# Patient Record
Sex: FEMALE | Race: WHITE | NOT HISPANIC OR LATINO | Employment: FULL TIME | URBAN - NONMETROPOLITAN AREA
[De-identification: names, ages, dates, MRNs, and addresses within clinical notes are randomized per-mention and may not be internally consistent; named-entity substitution may affect disease eponyms.]

---

## 2017-12-14 ENCOUNTER — TRANSCRIBE ORDERS (OUTPATIENT)
Dept: ADMINISTRATIVE | Facility: HOSPITAL | Age: 37
End: 2017-12-14

## 2017-12-14 DIAGNOSIS — N63.0 LUMP OR MASS IN BREAST: Primary | ICD-10-CM

## 2018-01-16 ENCOUNTER — HOSPITAL ENCOUNTER (OUTPATIENT)
Dept: MAMMOGRAPHY | Facility: HOSPITAL | Age: 38
Discharge: HOME | End: 2018-01-16
Admitting: INTERNAL MEDICINE

## 2018-01-16 ENCOUNTER — HOSPITAL ENCOUNTER (OUTPATIENT)
Dept: ULTRASOUND IMAGING | Facility: HOSPITAL | Age: 38
Discharge: HOME | End: 2018-01-16

## 2018-01-16 DIAGNOSIS — N63.0 LUMP OR MASS IN BREAST: ICD-10-CM

## 2018-01-16 PROCEDURE — 77062 BREAST TOMOSYNTHESIS BI: CPT | Performed by: RADIOLOGY

## 2018-01-16 PROCEDURE — 77066 DX MAMMO INCL CAD BI: CPT

## 2018-01-16 PROCEDURE — 77066 DX MAMMO INCL CAD BI: CPT | Performed by: RADIOLOGY

## 2018-01-16 PROCEDURE — 76642 ULTRASOUND BREAST LIMITED: CPT | Performed by: RADIOLOGY

## 2018-01-16 PROCEDURE — 76642 ULTRASOUND BREAST LIMITED: CPT

## 2018-01-16 PROCEDURE — G0279 TOMOSYNTHESIS, MAMMO: HCPCS

## 2021-05-04 ENCOUNTER — HOSPITAL ENCOUNTER (OUTPATIENT)
Dept: HOSPITAL 79 - LAB | Age: 41
End: 2021-05-04
Attending: COLON & RECTAL SURGERY
Payer: COMMERCIAL

## 2021-05-04 DIAGNOSIS — K60.1: Primary | ICD-10-CM

## 2021-05-04 DIAGNOSIS — R94.31: ICD-10-CM

## 2021-05-04 LAB
BUN/CREATININE RATIO: 30 (ref 0–10)
HGB BLD-MCNC: 11.5 GM/DL (ref 12.3–15.3)
RED BLOOD COUNT: 3.56 M/UL (ref 4–5.1)
WHITE BLOOD COUNT: 7.2 K/UL (ref 4.5–11)

## 2022-01-30 ENCOUNTER — HOSPITAL ENCOUNTER (OUTPATIENT)
Dept: HOSPITAL 79 - LAB | Age: 42
End: 2022-01-30
Attending: INTERNAL MEDICINE
Payer: COMMERCIAL

## 2022-01-30 DIAGNOSIS — Z79.899: ICD-10-CM

## 2022-01-30 DIAGNOSIS — B15.9: ICD-10-CM

## 2022-01-30 DIAGNOSIS — E11.9: ICD-10-CM

## 2022-01-30 DIAGNOSIS — I10: ICD-10-CM

## 2022-01-30 DIAGNOSIS — R76.8: ICD-10-CM

## 2022-01-30 DIAGNOSIS — Z01.84: Primary | ICD-10-CM

## 2022-01-30 LAB
BUN/CREATININE RATIO: 25 (ref 0–10)
HGB BLD-MCNC: 11 GM/DL (ref 12.3–15.3)
RED BLOOD COUNT: 3.79 M/UL (ref 4–5.1)
WHITE BLOOD COUNT: 5.9 K/UL (ref 4.5–11)

## 2022-02-01 LAB — VITAMIN D, 25-HYDROXY: 45.3 NG/ML (ref 30–100)

## 2022-08-29 ENCOUNTER — HOSPITAL ENCOUNTER (OUTPATIENT)
Dept: HOSPITAL 79 - EXRD | Age: 42
End: 2022-08-29
Attending: NURSE PRACTITIONER
Payer: COMMERCIAL

## 2022-08-29 DIAGNOSIS — E04.1: ICD-10-CM

## 2022-08-29 DIAGNOSIS — Z80.8: Primary | ICD-10-CM

## 2022-11-30 ENCOUNTER — OFFICE VISIT (OUTPATIENT)
Dept: ENDOCRINOLOGY | Facility: CLINIC | Age: 42
End: 2022-11-30

## 2022-11-30 VITALS
SYSTOLIC BLOOD PRESSURE: 106 MMHG | BODY MASS INDEX: 42.54 KG/M2 | OXYGEN SATURATION: 97 % | WEIGHT: 287.2 LBS | DIASTOLIC BLOOD PRESSURE: 72 MMHG | HEIGHT: 69 IN | HEART RATE: 85 BPM

## 2022-11-30 DIAGNOSIS — E04.2 MULTIPLE THYROID NODULES: Primary | ICD-10-CM

## 2022-11-30 PROCEDURE — 99204 OFFICE O/P NEW MOD 45 MIN: CPT | Performed by: NURSE PRACTITIONER

## 2022-11-30 RX ORDER — DESVENLAFAXINE 100 MG/1
TABLET, EXTENDED RELEASE ORAL
COMMUNITY

## 2022-11-30 RX ORDER — HYDROCHLOROTHIAZIDE 25 MG/1
25 TABLET ORAL DAILY
COMMUNITY

## 2022-11-30 RX ORDER — CHOLECALCIFEROL (VITAMIN D3) 125 MCG
TABLET ORAL
COMMUNITY

## 2022-11-30 RX ORDER — DEXTROMETHORPHAN HYDROBROMIDE AND QUINIDINE SULFATE 20; 10 MG/1; MG/1
CAPSULE, GELATIN COATED ORAL DAILY
COMMUNITY

## 2022-11-30 RX ORDER — SEMAGLUTIDE 1.34 MG/ML
INJECTION, SOLUTION SUBCUTANEOUS
COMMUNITY
Start: 2022-11-07

## 2022-11-30 RX ORDER — LOSARTAN POTASSIUM 50 MG/1
100 TABLET ORAL DAILY
COMMUNITY

## 2022-11-30 RX ORDER — PRAZOSIN HYDROCHLORIDE 2 MG/1
2 CAPSULE ORAL NIGHTLY
COMMUNITY

## 2022-11-30 RX ORDER — TEMAZEPAM 30 MG/1
30 CAPSULE ORAL NIGHTLY PRN
COMMUNITY

## 2022-11-30 RX ORDER — QUETIAPINE FUMARATE 100 MG/1
100 TABLET, FILM COATED ORAL NIGHTLY
COMMUNITY

## 2022-11-30 NOTE — PROGRESS NOTES
Chief Complaint   Patient presents with   • Thyroid nodule     Pt stated she gets hoarse often        Referring Provider  No ref. provider found     HPI   Deborah Baez is a 42 y.o. female had concerns including Thyroid nodule (Pt stated she gets hoarse often).   Seen as a new patient.  Thyroid Nodule.    Patient had an US Thyroid on 08/29/2022 that showed multiple nodules, right lobe 3 mm TR 3 nodule, left lobe 8 mm TR 3 and a 10 mm TR 4 nodule within the left lobe of the thyroid.  Six-month follow-up recommended on the 10 mm in the left lobe.    Birth state: [place]  Previous history of radiation to face/neck: none  Consuming foods high in iodine: none  Family history of thyroid complications: grandmother-thyroid cancer    The following portions of the patient's history were reviewed and updated as appropriate: allergies, current medications, past family history, past medical history, past social history, past surgical history and problem list.    History reviewed. No pertinent past medical history.  History reviewed. No pertinent surgical history.   History reviewed. No pertinent family history.   Social History     Socioeconomic History   • Marital status:    Tobacco Use   • Smoking status: Never   Substance and Sexual Activity   • Alcohol use: Yes   • Drug use: Never   • Sexual activity: Defer      Allergies   Allergen Reactions   • Lamictal [Lamotrigine] Hives      Current Outpatient Medications on File Prior to Visit   Medication Sig Dispense Refill   • Desvenlafaxine  MG tablet sustained-release 24 hour Take  by mouth.     • dextromethorphan-quinidine (Nuedexta) 20-10 MG capsule capsule Take  by mouth Daily.     • Ergocalciferol (Vitamin D2) 50 MCG (2000 UT) tablet Take  by mouth.     • hydroCHLOROthiazide (HYDRODIURIL) 25 MG tablet Take 25 mg by mouth Daily.     • losartan (COZAAR) 50 MG tablet Take 100 mg by mouth Daily.     • metFORMIN (GLUCOPHAGE) 1000 MG tablet Take 1,000 mg by mouth 2 (Two)  "Times a Day With Meals.     • Ozempic, 0.25 or 0.5 MG/DOSE, 2 MG/1.5ML solution pen-injector INJECT 0.5 MG SUBCUTANEOUSLY EVERY WEEK     • prazosin (MINIPRESS) 2 MG capsule Take 2 mg by mouth Every Night.     • QUEtiapine (SEROquel) 100 MG tablet Take 100 mg by mouth Every Night.     • temazepam (Restoril) 30 MG capsule Take 30 mg by mouth At Night As Needed for Sleep.       No current facility-administered medications on file prior to visit.      Review of Systems   Constitutional: Negative.    HENT: Positive for voice change. Negative for trouble swallowing.         Can sometimes feels something in her neck.   Eyes: Negative.    Endocrine: Negative.    Skin: Negative.    Psychiatric/Behavioral: Negative.    All other systems reviewed and are negative.     /72 (BP Location: Left arm, Patient Position: Sitting, Cuff Size: Adult)   Pulse 85   Ht 175.3 cm (69\")   Wt 130 kg (287 lb 3.2 oz)   SpO2 97%   BMI 42.41 kg/m²      Physical Exam  Vitals reviewed.   Constitutional:       Appearance: Normal appearance.   Eyes:      Extraocular Movements: Extraocular movements intact.   Neck:      Thyroid: Thyromegaly and thyroid tenderness present. No thyroid mass.   Cardiovascular:      Rate and Rhythm: Normal rate.   Pulmonary:      Effort: Pulmonary effort is normal.   Skin:     General: Skin is warm.   Neurological:      General: No focal deficit present.      Mental Status: She is alert and oriented to person, place, and time.   Psychiatric:         Mood and Affect: Mood normal.         Behavior: Behavior normal.         Thought Content: Thought content normal.         Judgment: Judgment normal.       Labs/Imaging  CMP  No results found for: GLUCOSE, BUN, CREATININE, EGFRIFNONA, EGFRIFAFRI, BCR, K, CO2, CALCIUM, PROTENTOTREF, ALBUMIN, LABIL2, BILIRUBIN, AST, ALT     CBC w/DIFF No results found for: WBC, RBC, HGB, HCT, MCV, MCH, MCHC, RDW, RDWSD, MPV, PLT, NEUTRORELPCT, LYMPHORELPCT, MONORELPCT, EOSRELPCT, " BASORELPCT, AUTOIGPER, NEUTROABS, LYMPHSABS, MONOSABS, EOSABS, BASOSABS, AUTOIGNUM, NRBC    TSH  No results found for: TSH    T4  No results found for: FREET4  No results found for: [id number]    T3  No results found for: T3FREE  No results found for: [id number]    TRAb  No results found for: TSURCPAB    TPO  No results found for: THYROIDAB    No valid procedures specified.    Assessment and Plan    Diagnoses and all orders for this visit:    1. Multiple thyroid nodules (Primary)  Assessment & Plan:  Discussed with patient and family findings of US Thyroid.  Will obtain repeat US in 2/2023 which will be 6 months past original US to monitor size.  If there is increase in size will obtain FNA.  If size if stable will monitor regularly.  Follow-up after US.    Orders:  -     US Thyroid; Future       Return in about 4 months (around 3/25/2023) for Follow-up appointment. The patient was instructed to contact the clinic with any interval questions or concerns.      This document has been electronically signed by LUIS Washington  November 30, 2022 13:41 EST   Endocrinology

## 2022-11-30 NOTE — ASSESSMENT & PLAN NOTE
Discussed with patient and family findings of US Thyroid.  Will obtain repeat US in 2/2023 which will be 6 months past original US to monitor size.  If there is increase in size will obtain FNA.  If size if stable will monitor regularly.  Follow-up after US.

## 2023-02-09 ENCOUNTER — HOSPITAL ENCOUNTER (OUTPATIENT)
Dept: ULTRASOUND IMAGING | Facility: HOSPITAL | Age: 43
Discharge: HOME | End: 2023-02-09
Admitting: NURSE PRACTITIONER
Payer: COMMERCIAL

## 2023-02-09 DIAGNOSIS — E04.2 MULTIPLE THYROID NODULES: ICD-10-CM

## 2023-02-09 PROCEDURE — 76536 US EXAM OF HEAD AND NECK: CPT

## 2023-02-09 PROCEDURE — 76536 US EXAM OF HEAD AND NECK: CPT | Performed by: RADIOLOGY

## 2023-02-14 ENCOUNTER — OFFICE VISIT (OUTPATIENT)
Dept: ENDOCRINOLOGY | Facility: CLINIC | Age: 43
End: 2023-02-14
Payer: COMMERCIAL

## 2023-02-14 VITALS
SYSTOLIC BLOOD PRESSURE: 160 MMHG | BODY MASS INDEX: 39.04 KG/M2 | OXYGEN SATURATION: 97 % | HEART RATE: 86 BPM | HEIGHT: 69 IN | DIASTOLIC BLOOD PRESSURE: 106 MMHG | WEIGHT: 263.6 LBS

## 2023-02-14 DIAGNOSIS — E04.1 SOLITARY THYROID NODULE: Primary | ICD-10-CM

## 2023-02-14 PROCEDURE — 99213 OFFICE O/P EST LOW 20 MIN: CPT | Performed by: NURSE PRACTITIONER

## 2023-02-14 NOTE — ASSESSMENT & PLAN NOTE
Discussed with patient and family findings of US Thyroid.  Will obtain repeat US in 8/2023 which will be 6 months past original US to monitor size.  If there is increase in size will obtain FNA.  If size if stable will monitor regularly.  Follow-up after US.

## 2023-02-14 NOTE — PROGRESS NOTES
Chief Complaint   Patient presents with   • Follow-up     thyroid        Referring Provider  No ref. provider found     HPI   Deborah Baez is a 42 y.o. female had concerns including Follow-up (thyroid).   Thyroid Nodule.    Her grandmother has a history of thyroid cancer. She was noted to have a nodule on US Thyroid.  She was sent here for further eval.    Birth state: [place]  Previous history of radiation to face/neck: none  Consuming foods high in iodine: none  Family history of thyroid complications: paternal aunts-thyroid replacement meds    The following portions of the patient's history were reviewed and updated as appropriate: allergies, current medications, past family history, past medical history, past social history, past surgical history and problem list.    US Thyroid: 08/29/2022  Impression: 10 mm TR 4 nodule within the left lobe of the thyroid.  6-month follow-up recommended.  US Thyroid: 02/09/2023   LEFT THYROID LOBE:  7 mm left lobe of thyroid nodule.    No past medical history on file.  Past Surgical History:   Procedure Laterality Date   • HYSTERECTOMY      partial 2006      Family History   Problem Relation Age of Onset   • Breast cancer Neg Hx       Social History     Socioeconomic History   • Marital status:    Tobacco Use   • Smoking status: Never   Substance and Sexual Activity   • Alcohol use: Yes   • Drug use: Never   • Sexual activity: Defer      Allergies   Allergen Reactions   • Lamictal [Lamotrigine] Hives      Current Outpatient Medications on File Prior to Visit   Medication Sig Dispense Refill   • Desvenlafaxine  MG tablet sustained-release 24 hour Take  by mouth.     • dextromethorphan-quinidine (Nuedexta) 20-10 MG capsule capsule Take  by mouth Daily.     • Ergocalciferol (Vitamin D2) 50 MCG (2000 UT) tablet Take  by mouth.     • hydroCHLOROthiazide (HYDRODIURIL) 25 MG tablet Take 25 mg by mouth Daily.     • losartan (COZAAR) 50 MG tablet Take 100 mg by mouth Daily.    "  • metFORMIN (GLUCOPHAGE) 1000 MG tablet Take 1,000 mg by mouth 2 (Two) Times a Day With Meals.     • Ozempic, 0.25 or 0.5 MG/DOSE, 2 MG/1.5ML solution pen-injector INJECT 0.5 MG SUBCUTANEOUSLY EVERY WEEK     • prazosin (MINIPRESS) 2 MG capsule Take 2 mg by mouth Every Night.     • QUEtiapine (SEROquel) 100 MG tablet Take 100 mg by mouth Every Night.     • temazepam (RESTORIL) 30 MG capsule Take 30 mg by mouth At Night As Needed for Sleep.       No current facility-administered medications on file prior to visit.      Review of Systems   Constitutional: Negative.    Eyes: Negative.    Endocrine: Negative.    Skin: Negative.    Psychiatric/Behavioral: Negative.    All other systems reviewed and are negative.     BP (!) 160/106 (BP Location: Right arm, Patient Position: Sitting, Cuff Size: Adult)   Pulse 86   Ht 175.3 cm (69\")   Wt 120 kg (263 lb 9.6 oz)   SpO2 97%   BMI 38.93 kg/m²      Physical Exam  Vitals reviewed.   Constitutional:       Appearance: Normal appearance.   Eyes:      Extraocular Movements: Extraocular movements intact.   Neck:      Thyroid: No thyroid mass, thyromegaly or thyroid tenderness.   Cardiovascular:      Rate and Rhythm: Normal rate.   Pulmonary:      Effort: Pulmonary effort is normal.   Skin:     General: Skin is warm.   Neurological:      General: No focal deficit present.      Mental Status: She is alert and oriented to person, place, and time.   Psychiatric:         Mood and Affect: Mood normal.         Behavior: Behavior normal.         Thought Content: Thought content normal.         Judgment: Judgment normal.       Labs/Imaging  CMP  No results found for: GLUCOSE, BUN, CREATININE, EGFRIFNONA, EGFRIFAFRI, BCR, K, CO2, CALCIUM, PROTENTOTREF, ALBUMIN, LABIL2, BILIRUBIN, AST, ALT     CBC w/DIFF No results found for: WBC, RBC, HGB, HCT, MCV, MCH, MCHC, RDW, RDWSD, MPV, PLT, NEUTRORELPCT, LYMPHORELPCT, MONORELPCT, EOSRELPCT, BASORELPCT, AUTOIGPER, NEUTROABS, LYMPHSABS, MONOSABS, " EOSABS, BASOSABS, AUTOIGNUM, NRBC    TSH  No results found for: TSH    T4  No results found for: FREET4  No results found for: [id number]    T3  No results found for: T3FREE  No results found for: S6SVYMG    TRAb  No results found for: TSURCPAB    TPO  No results found for: THYROIDAB    No valid procedures specified.    Assessment and Plan    Diagnoses and all orders for this visit:    1. Solitary thyroid nodule (Primary)  Assessment & Plan:  Discussed with patient and family findings of US Thyroid.  Will obtain repeat US in 8/2023 which will be 6 months past original US to monitor size.  If there is increase in size will obtain FNA.  If size if stable will monitor regularly.  Follow-up after US.    Orders:  -     US Thyroid; Future       Return in about 6 months (around 8/14/2023) for Follow-up appointment. The patient was instructed to contact the clinic with any interval questions or concerns.      This document has been electronically signed by LUIS Washington  February 14, 2023 13:15 EST   Endocrinology

## 2023-07-26 ENCOUNTER — HOSPITAL ENCOUNTER (OUTPATIENT)
Dept: ULTRASOUND IMAGING | Facility: HOSPITAL | Age: 43
Discharge: HOME | End: 2023-07-26
Admitting: NURSE PRACTITIONER
Payer: COMMERCIAL

## 2023-07-26 DIAGNOSIS — E04.1 SOLITARY THYROID NODULE: ICD-10-CM

## 2023-07-26 PROCEDURE — 76536 US EXAM OF HEAD AND NECK: CPT | Performed by: RADIOLOGY

## 2023-07-26 PROCEDURE — 76536 US EXAM OF HEAD AND NECK: CPT

## 2023-10-11 ENCOUNTER — HOSPITAL ENCOUNTER (OUTPATIENT)
Dept: GENERAL RADIOLOGY | Facility: HOSPITAL | Age: 43
Discharge: HOME | End: 2023-10-11
Payer: COMMERCIAL

## 2023-10-11 ENCOUNTER — OFFICE VISIT (OUTPATIENT)
Dept: ORTHOPEDIC SURGERY | Facility: CLINIC | Age: 43
End: 2023-10-11
Payer: OTHER GOVERNMENT

## 2023-10-11 VITALS
OXYGEN SATURATION: 95 % | BODY MASS INDEX: 38.95 KG/M2 | HEART RATE: 91 BPM | HEIGHT: 69 IN | DIASTOLIC BLOOD PRESSURE: 94 MMHG | SYSTOLIC BLOOD PRESSURE: 135 MMHG | WEIGHT: 263 LBS

## 2023-10-11 DIAGNOSIS — M75.90 SUPRASPINATUS TENDINITIS, UNSPECIFIED LATERALITY: ICD-10-CM

## 2023-10-11 DIAGNOSIS — M25.512 CHRONIC PAIN OF BOTH SHOULDERS: Primary | ICD-10-CM

## 2023-10-11 DIAGNOSIS — M54.6 ACUTE RIGHT-SIDED THORACIC BACK PAIN: ICD-10-CM

## 2023-10-11 DIAGNOSIS — M25.611 SHOULDER JOINT STIFFNESS, BILATERAL: ICD-10-CM

## 2023-10-11 DIAGNOSIS — M25.612 SHOULDER JOINT STIFFNESS, BILATERAL: ICD-10-CM

## 2023-10-11 DIAGNOSIS — M47.814 OSTEOARTHRITIS OF THORACIC SPINE, UNSPECIFIED SPINAL OSTEOARTHRITIS COMPLICATION STATUS: ICD-10-CM

## 2023-10-11 DIAGNOSIS — G25.89 SCAPULAR DYSKINESIS: ICD-10-CM

## 2023-10-11 DIAGNOSIS — G89.29 CHRONIC PAIN OF BOTH SHOULDERS: Primary | ICD-10-CM

## 2023-10-11 DIAGNOSIS — M25.511 CHRONIC PAIN OF BOTH SHOULDERS: Primary | ICD-10-CM

## 2023-10-11 PROCEDURE — 73030 X-RAY EXAM OF SHOULDER: CPT

## 2023-10-11 PROCEDURE — 99204 OFFICE O/P NEW MOD 45 MIN: CPT | Performed by: FAMILY MEDICINE

## 2023-10-11 PROCEDURE — 72072 X-RAY EXAM THORAC SPINE 3VWS: CPT

## 2023-10-11 RX ORDER — LURASIDONE HYDROCHLORIDE 80 MG/1
TABLET, FILM COATED ORAL
COMMUNITY
Start: 2023-09-26

## 2023-10-11 RX ORDER — MELOXICAM 7.5 MG/1
7.5 TABLET ORAL DAILY
Qty: 30 TABLET | Refills: 1 | Status: SHIPPED | OUTPATIENT
Start: 2023-10-11

## 2023-10-11 RX ORDER — LORAZEPAM 0.5 MG/1
1 TABLET ORAL DAILY
COMMUNITY
Start: 2023-09-26

## 2023-10-11 RX ORDER — TRAZODONE HYDROCHLORIDE 50 MG/1
50 TABLET ORAL NIGHTLY
COMMUNITY

## 2023-10-11 RX ORDER — BRIMONIDINE TARTRATE 2 MG/ML
SOLUTION/ DROPS OPHTHALMIC
COMMUNITY
Start: 2023-09-15

## 2023-10-11 RX ORDER — SEMAGLUTIDE 1.34 MG/ML
INJECTION, SOLUTION SUBCUTANEOUS
COMMUNITY
Start: 2023-09-15

## 2023-10-11 NOTE — PROGRESS NOTES
New Patient Visit      Patient: Deborah Baez  YOB: 1980  Date of Encounter: 10/11/2023  PCP: Raissa Ro MD  Referring Provider: No ref. provider found     Subjective   Deborah Baez is a 43 y.o. female who presents to the office today for evaluation of Initial Evaluation and Pain of the Left Upper Arm and Pain and Initial Evaluation of the Right Upper Arm      Chief Complaint   Patient presents with    Left Upper Arm - Initial Evaluation, Pain    Right Upper Arm - Pain, Initial Evaluation       HPI  New patient who presents complaining of bilateral arm pain that started without injury in June of this year.  States that the left is worse than the right pain radiates down from the shoulders down into both upper arms and hands at the elbows.  Achy at night and worse in the morning.  Worse with activity.  Seems to be getting worse.  Patient denies neck pain chest pain shortness of breath or palpitations.  Patient has notable history of traumatic brain injury after a fall down some steps in 2016 with lingering deficits in speech learning and function.  Has been taking Tylenol and ibuprofen which is helpful  Patient Active Problem List   Diagnosis    Solitary thyroid nodule       Past Medical History:   Diagnosis Date    Anxiety and depression     Brain injury     Diabetes     Hypertension        Past Surgical History:   Procedure Laterality Date    ENDOMETRIAL ABLATION      GASTRIC BYPASS      OVARY SURGERY      TUMOR REMOVAL      low back       Family History   Problem Relation Age of Onset    Breast cancer Neg Hx        Social History     Socioeconomic History    Marital status:    Tobacco Use    Smoking status: Never    Smokeless tobacco: Never   Vaping Use    Vaping Use: Never used   Substance and Sexual Activity    Alcohol use: Yes     Comment: Occasionally    Drug use: Never    Sexual activity: Defer       Current Outpatient Medications   Medication Sig Dispense Refill     "brimonidine (ALPHAGAN) 0.2 % ophthalmic solution INSTILL 1 DROP INTO BOTH EYES TWICE DAILY FOR GLAUCOMA      LORazepam (ATIVAN) 0.5 MG tablet Take 1 tablet by mouth Daily.      losartan (COZAAR) 50 MG tablet Take 0.5 tablets by mouth Daily.      Lurasidone HCl (LATUDA) 80 MG tablet tablet TAKE 1 TABLET BY MOUTH EVERY DAY WITH FOOD (AT LEAST 350 CALORIES)      metFORMIN (GLUCOPHAGE) 1000 MG tablet Take 1 tablet by mouth 2 (Two) Times a Day With Meals.      Ozempic, 1 MG/DOSE, 4 MG/3ML solution pen-injector INJECT 1 MG SUBCUTANEOUSLY EVERY WEEK      sertraline (ZOLOFT) 50 MG tablet TAKE 1 TABLET BY MOUTH EVERY DAY FOR MOOD      temazepam (RESTORIL) 30 MG capsule Take 1 capsule by mouth At Night As Needed for Sleep.      traZODone (DESYREL) 50 MG tablet Take 1 tablet by mouth Every Night.      dextromethorphan-quinidine (Nuedexta) 20-10 MG capsule capsule Take  by mouth Daily.      Ergocalciferol (Vitamin D2) 50 MCG (2000 UT) tablet Take  by mouth.      hydroCHLOROthiazide (HYDRODIURIL) 25 MG tablet Take 1 tablet by mouth Daily.      prazosin (MINIPRESS) 2 MG capsule Take 1 capsule by mouth Every Night.       No current facility-administered medications for this visit.       Allergies   Allergen Reactions    Lamictal [Lamotrigine] Hives       Review of Systems   Constitutional:  Positive for activity change. Negative for fever.   Respiratory:  Negative for shortness of breath and wheezing.    Cardiovascular:  Negative for chest pain.   Musculoskeletal:  Positive for arthralgias and myalgias. Negative for neck pain.   Skin:  Negative for color change and wound.   Neurological:  Negative for weakness and numbness.       Visit Vitals  /94 (BP Location: Right arm, Patient Position: Sitting, Cuff Size: Adult)   Pulse 91   Ht 175.3 cm (69.02\")   Wt 119 kg (263 lb)   SpO2 95%   BMI 38.82 kg/mý     43 y.o.female  Physical Exam  Vitals and nursing note reviewed.   Constitutional:       General: She is not in acute " distress.     Appearance: Normal appearance.   Pulmonary:      Effort: Pulmonary effort is normal. No respiratory distress.   Skin:     General: Skin is warm and dry.      Findings: No erythema.   Neurological:      General: No focal deficit present.      Mental Status: She is alert.      Sensory: No sensory deficit.      Motor: No weakness.   Hunched kyphotic head forward posture  Tenderness to palpation of right thoracic paraspinals and transverse processes at T8 and T9.  Nontender otherwise    Nontender cervical spine.  Mildly restricted range of motion and mild left-sided local pain on Spurling without radiation    Dyskinetic scapular motion worse on the right  Soreness of proximal left arm to palpation without rash erythema or swelling  Bilateral shoulders restricted range of motion in flexion abduction and external rotation with endrange pain.  Tender on anterior shoulders bilaterally.  Painful testing of supraspinatus, [place] sign, AC crossover and impingement signs.  Painless testing of the wrist the rotator cuff and the biceps and triceps.  No weakness.  Intact pinch  and intrinsic strength and sensation      Radiology Results:    XR Spine Thoracic 3 View  Narrative: EXAM:    XR Thoracic Spine, 3 Views     EXAM DATE:    10/11/2023 12:36 PM     CLINICAL HISTORY:    sharp right sided thoracic pain near transverse process of T8 and T9.  no injury; M54.6-Pain in thoracic spine     TECHNIQUE:    Frontal, lateral and swimmer's views of the thoracic spine.     COMPARISON:    No relevant prior studies available.     FINDINGS:    VERTEBRAE:  Unremarkable.  No acute fracture.  Normal alignment.    DISC SPACES:  No acute findings.  No significant narrowing.    SOFT TISSUES:  Unremarkable.     Impression:   No acute findings in the thoracic spine.        This report was finalized on 10/11/2023 1:00 PM by Dr. Scar Roman MD.     XR Shoulder 2+ View Bilateral  Narrative: EXAM:    XR Bilateral Shoulders Complete, 2  or More Views     EXAM DATE:    10/11/2023 12:35 PM     CLINICAL HISTORY:    bilateral shoulder pain and stiffness since june. no injury;  M25.511-Pain in right shoulder; G89.29-Other chronic pain; M25.512-Pain  in left shoulder; M25.611-Stiffness of right shoulder, not elsewhere  classified; M25.612-Stiffness of left shoulder, not elsewhere  classified; M75.90-Shoulder lesion, unspecified, unspecified shoulder;  G25.89-Other specified extrapyramidal and movement disorders; M5     TECHNIQUE:    Two or more views of the bilateral shoulders.     COMPARISON:    No relevant prior studies available.     FINDINGS:    BONES/JOINTS:  Unremarkable.  No acute fracture.  No dislocation.    SOFT TISSUES:  Unremarkable.     Impression:   No acute findings in the bilateral shoulders.        This report was finalized on 10/11/2023 1:00 PM by Dr. Scar Roman MD.           Assessment & Plan   Diagnoses and all orders for this visit:    1. Chronic pain of both shoulders (Primary)  -     XR Shoulder 2+ View Bilateral  -     meloxicam (MOBIC) 7.5 MG tablet; Take 1 tablet by mouth Daily.  Dispense: 30 tablet; Refill: 1    2. Shoulder joint stiffness, bilateral  -     XR Shoulder 2+ View Bilateral  -     meloxicam (MOBIC) 7.5 MG tablet; Take 1 tablet by mouth Daily.  Dispense: 30 tablet; Refill: 1    3. Supraspinatus tendinitis, unspecified laterality  -     XR Shoulder 2+ View Bilateral  -     meloxicam (MOBIC) 7.5 MG tablet; Take 1 tablet by mouth Daily.  Dispense: 30 tablet; Refill: 1    4. Scapular dyskinesis  -     XR Shoulder 2+ View Bilateral  -     meloxicam (MOBIC) 7.5 MG tablet; Take 1 tablet by mouth Daily.  Dispense: 30 tablet; Refill: 1    5. Acute right-sided thoracic back pain  -     XR Shoulder 2+ View Bilateral  -     XR Spine Thoracic 3 View  -     meloxicam (MOBIC) 7.5 MG tablet; Take 1 tablet by mouth Daily.  Dispense: 30 tablet; Refill: 1    6. Osteoarthritis of thoracic spine, unspecified spinal osteoarthritis  complication status  -     meloxicam (MOBIC) 7.5 MG tablet; Take 1 tablet by mouth Daily.  Dispense: 30 tablet; Refill: 1         MEDS ORDERED DURING VISIT:  No orders of the defined types were placed in this encounter.    MEDICATION ISSUES:  Discussed medication options and treatment plan of prescribed medication as well as the risks, benefits, and side effects including potential falls, possible impaired driving and metabolic adversities among others. Patient is agreeable to call the office with any worsening of symptoms or onset of side effects. Patient is agreeable to call 911 or go to the nearest ER should he/she begin having SI/HI.     Discussion:  No overt causes for the patient's pain is notable on imaging.  Unlikely to be associated with her TBI due to recent onset.  Patient does have significant postural deficits and poor scapular control.  Recommended home exercises to help with thoracic range of motion and strengthening as well as shoulders and shoulder blades.  May be some rotator cuff tendinitis or bursitis component to this as well.  Patient will try Mobic and home exercises to start with.  Discontinue OTC ibuprofen.  Consider physical therapy and injections if not improving as well as advanced imaging and imaging of the neck.  Follow-up in 1 month             This document has been electronically signed by Hayden Carreno DO   October 11, 2023 11:31 EDT    Part of this note may be an electronic transcription/translation of spoken language to printed text using the Dragon Dictation System.

## 2023-11-13 ENCOUNTER — OFFICE VISIT (OUTPATIENT)
Dept: ORTHOPEDIC SURGERY | Facility: CLINIC | Age: 43
End: 2023-11-13
Payer: COMMERCIAL

## 2023-11-13 VITALS
OXYGEN SATURATION: 98 % | WEIGHT: 200 LBS | SYSTOLIC BLOOD PRESSURE: 129 MMHG | HEIGHT: 69 IN | HEART RATE: 82 BPM | DIASTOLIC BLOOD PRESSURE: 95 MMHG | BODY MASS INDEX: 29.62 KG/M2

## 2023-11-13 DIAGNOSIS — M25.512 CHRONIC PAIN OF BOTH SHOULDERS: ICD-10-CM

## 2023-11-13 DIAGNOSIS — M54.6 ACUTE RIGHT-SIDED THORACIC BACK PAIN: ICD-10-CM

## 2023-11-13 DIAGNOSIS — M25.611 SHOULDER JOINT STIFFNESS, BILATERAL: ICD-10-CM

## 2023-11-13 DIAGNOSIS — M25.612 SHOULDER JOINT STIFFNESS, BILATERAL: ICD-10-CM

## 2023-11-13 DIAGNOSIS — M75.90 SUPRASPINATUS TENDINITIS, UNSPECIFIED LATERALITY: ICD-10-CM

## 2023-11-13 DIAGNOSIS — G25.89 SCAPULAR DYSKINESIS: ICD-10-CM

## 2023-11-13 DIAGNOSIS — G89.29 CHRONIC PAIN OF BOTH SHOULDERS: ICD-10-CM

## 2023-11-13 DIAGNOSIS — M25.511 CHRONIC PAIN OF BOTH SHOULDERS: ICD-10-CM

## 2023-11-13 DIAGNOSIS — M47.814 OSTEOARTHRITIS OF THORACIC SPINE, UNSPECIFIED SPINAL OSTEOARTHRITIS COMPLICATION STATUS: ICD-10-CM

## 2023-11-13 RX ORDER — MELOXICAM 7.5 MG/1
15 TABLET ORAL DAILY
Start: 2023-11-13

## 2023-11-13 RX ADMIN — LIDOCAINE HYDROCHLORIDE 5 ML: 10 INJECTION, SOLUTION EPIDURAL; INFILTRATION; INTRACAUDAL; PERINEURAL at 17:25

## 2023-11-13 RX ADMIN — METHYLPREDNISOLONE ACETATE 80 MG: 80 INJECTION, SUSPENSION INTRA-ARTICULAR; INTRALESIONAL; INTRAMUSCULAR; SOFT TISSUE at 17:25

## 2023-11-13 NOTE — PROGRESS NOTES
"Follow Up Visit      Patient: Deborah Baez  YOB: 1980  Date of Encounter: 11/13/2023  PCP: Raissa Ro MD  Referring Provider: No ref. provider found     Subjective   Deborah Baez is a 43 y.o. female who presents to the office today for evaluation of Follow-up and Pain of the Right Shoulder and Follow-up and Pain of the Left Shoulder      Chief Complaint   Patient presents with    Right Shoulder - Follow-up, Pain    Left Shoulder - Follow-up, Pain       HPI  Patient presents for follow-up for bilateral shoulder pain worse in the left than the right.  Pain continues to shoot down into both arms with numbness and tingling.  Denies neck pain stiffness.  Home exercise regimen and conservative treatments so far have been of no help.  States she is getting no relief from Mobic at the current dose 7.5 mg  Patient Active Problem List   Diagnosis    Solitary thyroid nodule       Past Medical History:   Diagnosis Date    Anxiety and depression     Brain injury     Diabetes     Hypertension        Allergies   Allergen Reactions    Lamictal [Lamotrigine] Hives       Review of Systems   Constitutional:  Positive for activity change. Negative for fever.   Respiratory:  Negative for shortness of breath and wheezing.    Cardiovascular:  Negative for chest pain.   Musculoskeletal:  Positive for arthralgias and myalgias. Negative for neck pain.   Skin:  Negative for color change and wound.   Neurological:  Negative for weakness and numbness.       Visit Vitals  /95 (BP Location: Left arm, Patient Position: Sitting, Cuff Size: Adult)   Pulse 82   Ht 175.3 cm (69.02\")   Wt 90.7 kg (200 lb)   SpO2 98%   BMI 29.52 kg/m²     43 y.o.female  Physical Exam  Vitals and nursing note reviewed.   Constitutional:       General: She is not in acute distress.     Appearance: Normal appearance.   Pulmonary:      Effort: Pulmonary effort is normal. No respiratory distress.   Musculoskeletal:      Comments: Hunched " kyphotic head forward posture  Tenderness to palpation of right thoracic paraspinals and transverse processes at T8 and T9.  Nontender otherwise    Nontender cervical spine.  Mildly restricted range of motion and mild left-sided local pain on Spurling without radiation    Dyskinetic scapular motion worse on the right  Soreness to palpation  of proximal bilateral arm to palpation without rash erythema or swelling. All pain on resisted testing is felt in this area.  Bilateral shoulders restricted range of motion in flexion abduction and external rotation with endrange pain.  Tender on anterior shoulders bilaterally.  Painful testing of supraspinatus, [place] sign, AC crossover and impingement signs.  Painless testing of the wrist the rotator cuff and the biceps and triceps.  No weakness.  Intact pinch  and intrinsic strength and sensation     Skin:     General: Skin is warm and dry.      Findings: No erythema.   Neurological:      General: No focal deficit present.      Mental Status: She is alert.      Sensory: No sensory deficit.      Motor: No weakness.     Large Joint Arthrocentesis: L subacromial bursa  Date/Time: 11/13/2023 5:25 PM  Consent given by: patient  Site marked: site marked  Timeout: Immediately prior to procedure a time out was called to verify the correct patient, procedure, equipment, support staff and site/side marked as required   Supporting Documentation  Indications: pain   Procedure Details  Location: shoulder - L subacromial bursa  Needle size: 22 G  Approach: posterior  Medications administered: 80 mg methylPREDNISolone acetate 80 MG/ML; 5 mL lidocaine PF 1% 1 %  Patient tolerance: patient tolerated the procedure well with no immediate complications     Injection site in the posterior shoulder was cleaned with alcohol and iodine.  Anesthesia with ethyl chloride.  Then using sterile technique the subacromial space was accessed with a 22-gauge 1.5 inch needle injected with 5 cc 1% lidocaine  without epinephrine and 80 mg methylprednisolone.  Injection site was covered with sterile bandage.  There were no immediate adverse effects and negligible blood loss..  Follow-up care and precautions were discussed.    Radiology Results:    No radiology results for the last 30 days.  XR Spine Thoracic 3 View  Narrative: EXAM:    XR Thoracic Spine, 3 Views     EXAM DATE:    10/11/2023 12:36 PM     CLINICAL HISTORY:    sharp right sided thoracic pain near transverse process of T8 and T9.  no injury; M54.6-Pain in thoracic spine     TECHNIQUE:    Frontal, lateral and swimmer's views of the thoracic spine.     COMPARISON:    No relevant prior studies available.     FINDINGS:    VERTEBRAE:  Unremarkable.  No acute fracture.  Normal alignment.    DISC SPACES:  No acute findings.  No significant narrowing.    SOFT TISSUES:  Unremarkable.     Impression:   No acute findings in the thoracic spine.        This report was finalized on 10/11/2023 1:00 PM by Dr. Scar Roman MD.     XR Shoulder 2+ View Bilateral  Narrative: EXAM:    XR Bilateral Shoulders Complete, 2 or More Views     EXAM DATE:    10/11/2023 12:35 PM     CLINICAL HISTORY:    bilateral shoulder pain and stiffness since june. no injury;  M25.511-Pain in right shoulder; G89.29-Other chronic pain; M25.512-Pain  in left shoulder; M25.611-Stiffness of right shoulder, not elsewhere  classified; M25.612-Stiffness of left shoulder, not elsewhere  classified; M75.90-Shoulder lesion, unspecified, unspecified shoulder;  G25.89-Other specified extrapyramidal and movement disorders; M5     TECHNIQUE:    Two or more views of the bilateral shoulders.     COMPARISON:    No relevant prior studies available.     FINDINGS:    BONES/JOINTS:  Unremarkable.  No acute fracture.  No dislocation.    SOFT TISSUES:  Unremarkable.     Impression:   No acute findings in the bilateral shoulders.        This report was finalized on 10/11/2023 1:00 PM by Dr. Scar Roman MD.          Assessment & Plan   Diagnoses and all orders for this visit:    1. Chronic pain of both shoulders  -     meloxicam (MOBIC) 7.5 MG tablet; Take 2 tablets by mouth Daily.    2. Shoulder joint stiffness, bilateral  -     meloxicam (MOBIC) 7.5 MG tablet; Take 2 tablets by mouth Daily.    3. Supraspinatus tendinitis, unspecified laterality  -     meloxicam (MOBIC) 7.5 MG tablet; Take 2 tablets by mouth Daily.    4. Scapular dyskinesis  -     meloxicam (MOBIC) 7.5 MG tablet; Take 2 tablets by mouth Daily.    5. Acute right-sided thoracic back pain  -     meloxicam (MOBIC) 7.5 MG tablet; Take 2 tablets by mouth Daily.    6. Osteoarthritis of thoracic spine, unspecified spinal osteoarthritis complication status  -     meloxicam (MOBIC) 7.5 MG tablet; Take 2 tablets by mouth Daily.    Other orders  -     Large Joint Arthrocentesis: L subacromial bursa         MEDS ORDERED DURING VISIT:  New Medications Ordered This Visit   Medications    meloxicam (MOBIC) 7.5 MG tablet     Sig: Take 2 tablets by mouth Daily.     MEDICATION ISSUES:  Discussed medication options and treatment plan of prescribed medication as well as the risks, benefits, and side effects including potential falls, possible impaired driving and metabolic adversities among others. Patient is agreeable to call the office with any worsening of symptoms or onset of side effects. Patient is agreeable to call 911 or go to the nearest ER should he/she begin having SI/HI.     Discussion:  Patient continues to have shoulder issues.  Discussed further treatment options and recommended increasing Mobic dose to 15 mg using the pills she has left.  If this is helpful I will refill it if not we will change to a different NSAID.  Discussed further treatment options including PT injections and advanced imaging and patient elected to go with a steroid injection which she received in the left shoulder without adverse effect and noted improvement in pain immediately after.   Patient will follow-up in 2 to 3 weeks for reevaluation consider further interventions if needed             This document has been electronically signed by Hayden Carreno DO   November 13, 2023 17:23 EST    Dictated Utilizing Dragon Dictation: Part of this note may be an electronic transcription/translation of spoken language to printed text using the Dragon Dictation System.

## 2023-11-17 RX ORDER — LIDOCAINE HYDROCHLORIDE 10 MG/ML
5 INJECTION, SOLUTION EPIDURAL; INFILTRATION; INTRACAUDAL; PERINEURAL
Status: COMPLETED | OUTPATIENT
Start: 2023-11-13 | End: 2023-11-13

## 2023-11-17 RX ORDER — METHYLPREDNISOLONE ACETATE 80 MG/ML
80 INJECTION, SUSPENSION INTRA-ARTICULAR; INTRALESIONAL; INTRAMUSCULAR; SOFT TISSUE
Status: COMPLETED | OUTPATIENT
Start: 2023-11-13 | End: 2023-11-13

## 2023-11-28 ENCOUNTER — OFFICE VISIT (OUTPATIENT)
Dept: ORTHOPEDIC SURGERY | Facility: CLINIC | Age: 43
End: 2023-11-28
Payer: COMMERCIAL

## 2023-11-28 VITALS
HEART RATE: 80 BPM | SYSTOLIC BLOOD PRESSURE: 117 MMHG | OXYGEN SATURATION: 96 % | HEIGHT: 69 IN | BODY MASS INDEX: 29.62 KG/M2 | WEIGHT: 200 LBS | DIASTOLIC BLOOD PRESSURE: 89 MMHG

## 2023-11-28 DIAGNOSIS — M25.611 SHOULDER JOINT STIFFNESS, BILATERAL: ICD-10-CM

## 2023-11-28 DIAGNOSIS — M25.512 CHRONIC PAIN OF BOTH SHOULDERS: Primary | ICD-10-CM

## 2023-11-28 DIAGNOSIS — M75.51 BILATERAL SHOULDER BURSITIS: ICD-10-CM

## 2023-11-28 DIAGNOSIS — G89.29 CHRONIC PAIN OF BOTH SHOULDERS: Primary | ICD-10-CM

## 2023-11-28 DIAGNOSIS — M25.612 SHOULDER JOINT STIFFNESS, BILATERAL: ICD-10-CM

## 2023-11-28 DIAGNOSIS — M79.601 PAIN IN BOTH UPPER EXTREMITIES: ICD-10-CM

## 2023-11-28 DIAGNOSIS — M79.602 PAIN IN BOTH UPPER EXTREMITIES: ICD-10-CM

## 2023-11-28 DIAGNOSIS — M75.52 BILATERAL SHOULDER BURSITIS: ICD-10-CM

## 2023-11-28 DIAGNOSIS — M25.511 CHRONIC PAIN OF BOTH SHOULDERS: Primary | ICD-10-CM

## 2023-11-28 PROCEDURE — 99214 OFFICE O/P EST MOD 30 MIN: CPT | Performed by: FAMILY MEDICINE

## 2023-11-28 RX ORDER — DIAZEPAM 2 MG/1
TABLET ORAL
COMMUNITY
Start: 2023-11-13

## 2023-11-29 NOTE — PROGRESS NOTES
"Follow Up Visit      Patient: Deborah Baez  YOB: 1980  Date of Encounter: 11/28/2023  PCP: Raissa Ro MD  Referring Provider: No ref. provider found     Subjective   Deborah Baez is a 43 y.o. female who presents to the office today for evaluation of Follow-up and Pain of the Right Shoulder and Follow-up and Pain of the Left Shoulder      Chief Complaint   Patient presents with    Right Shoulder - Follow-up, Pain    Left Shoulder - Follow-up, Pain       HPI  Patient presents for follow-up of bilateral shoulder pain.  Complains of pain still going into both shoulders and upper arms.  The injection she received last visit was of no help.  States that current medications are not helping either.  Patient Active Problem List   Diagnosis    Solitary thyroid nodule       Past Medical History:   Diagnosis Date    Anxiety and depression     Brain injury     Diabetes     Hypertension        Allergies   Allergen Reactions    Lamictal [Lamotrigine] Hives       Review of Systems   Constitutional:  Positive for activity change. Negative for fever.   Respiratory:  Negative for shortness of breath and wheezing.    Cardiovascular:  Negative for chest pain.   Musculoskeletal:  Positive for arthralgias and myalgias. Negative for neck pain.   Skin:  Negative for color change and wound.   Neurological:  Negative for weakness and numbness.       Visit Vitals  /89 (BP Location: Right arm, Patient Position: Sitting, Cuff Size: Large Adult)   Pulse 80   Ht 175.3 cm (69.02\")   Wt 90.7 kg (200 lb)   SpO2 96%   BMI 29.52 kg/m²     43 y.o.female  Physical Exam  Vitals and nursing note reviewed.   Constitutional:       General: She is not in acute distress.     Appearance: Normal appearance.   Pulmonary:      Effort: Pulmonary effort is normal. No respiratory distress.   Musculoskeletal:        Arms:       Comments: Hunched kyphotic head forward posture    Nontender cervical spine.  Mildly restricted range of " motion and negative Spurling without radiation    Dyskinetic scapular motion worse on the right  Soreness to palpation  of proximal bilateral arm to palpation without rash erythema or swelling. All pain on resisted testing is felt in this area.  Bilateral shoulders restricted range of motion in flexion abduction and external rotation with endrange pain.  Tender on anterior shoulders bilaterally and upper arms. Tender left supraspinatus muscle belly, levator and rhomboid.  Painful testing of rotator cuff, [place] sign, AC crossover and impingement signs, biceps and triceps bilaterally with all pain felt in the lateral/anterior arm and deltoid distribution.  No weakness.  Intact pinch  and intrinsic strength and sensation     Skin:     General: Skin is warm and dry.      Findings: No erythema.   Neurological:      General: No focal deficit present.      Mental Status: She is alert.      Sensory: No sensory deficit.      Motor: No weakness.       Radiology Results:    No radiology results for the last 30 days.    Assessment & Plan   Diagnoses and all orders for this visit:    1. Chronic pain of both shoulders (Primary)  -     XR Spine Cervical Complete 4 or 5 View  -     diclofenac (VOLTAREN) 50 MG EC tablet; Take 1 tablet by mouth 2 (Two) Times a Day As Needed (arm pain).  Dispense: 60 tablet; Refill: 1  -     Diclofenac Sodium (VOLTAREN) 1 % gel gel; Apply 2 g topically to the appropriate area as directed 4 (Four) Times a Day As Needed (arm pain). 2g to each arm 4x day prn  Dispense: 150 g; Refill: 2    2. Shoulder joint stiffness, bilateral  -     XR Spine Cervical Complete 4 or 5 View  -     diclofenac (VOLTAREN) 50 MG EC tablet; Take 1 tablet by mouth 2 (Two) Times a Day As Needed (arm pain).  Dispense: 60 tablet; Refill: 1  -     Diclofenac Sodium (VOLTAREN) 1 % gel gel; Apply 2 g topically to the appropriate area as directed 4 (Four) Times a Day As Needed (arm pain). 2g to each arm 4x day prn  Dispense: 150  g; Refill: 2    3. Bilateral shoulder bursitis  -     XR Spine Cervical Complete 4 or 5 View  -     diclofenac (VOLTAREN) 50 MG EC tablet; Take 1 tablet by mouth 2 (Two) Times a Day As Needed (arm pain).  Dispense: 60 tablet; Refill: 1  -     Diclofenac Sodium (VOLTAREN) 1 % gel gel; Apply 2 g topically to the appropriate area as directed 4 (Four) Times a Day As Needed (arm pain). 2g to each arm 4x day prn  Dispense: 150 g; Refill: 2    4. Pain in both upper extremities  -     XR Spine Cervical Complete 4 or 5 View  -     diclofenac (VOLTAREN) 50 MG EC tablet; Take 1 tablet by mouth 2 (Two) Times a Day As Needed (arm pain).  Dispense: 60 tablet; Refill: 1  -     Diclofenac Sodium (VOLTAREN) 1 % gel gel; Apply 2 g topically to the appropriate area as directed 4 (Four) Times a Day As Needed (arm pain). 2g to each arm 4x day prn  Dispense: 150 g; Refill: 2         MEDS ORDERED DURING VISIT:  New Medications Ordered This Visit   Medications    diclofenac (VOLTAREN) 50 MG EC tablet     Sig: Take 1 tablet by mouth 2 (Two) Times a Day As Needed (arm pain).     Dispense:  60 tablet     Refill:  1    Diclofenac Sodium (VOLTAREN) 1 % gel gel     Sig: Apply 2 g topically to the appropriate area as directed 4 (Four) Times a Day As Needed (arm pain). 2g to each arm 4x day prn     Dispense:  150 g     Refill:  2     MEDICATION ISSUES:  Discussed medication options and treatment plan of prescribed medication as well as the risks, benefits, and side effects including potential falls, possible impaired driving and metabolic adversities among others. Patient is agreeable to call the office with any worsening of symptoms or onset of side effects. Patient is agreeable to call 911 or go to the nearest ER should he/she begin having SI/HI.     Discussion:  Patient continues to have bilateral shoulder and upper arm pain.  Still unsure the exact origin of her pain.  Discontinue Mobic which is ineffective. Will try oral and topical  diclofenac instead. Discussed possibliity of further injections and PT. recommend workup for the neck for possible radicular source.  Follow-up in 2 to 4 weeks             This document has been electronically signed by Hayden Carreno DO   November 29, 2023 08:51 EST    Dictated Utilizing Dragon Dictation: Part of this note may be an electronic transcription/translation of spoken language to printed text using the Dragon Dictation System.

## 2024-01-04 DIAGNOSIS — M25.512 CHRONIC PAIN OF BOTH SHOULDERS: ICD-10-CM

## 2024-01-04 DIAGNOSIS — G89.29 CHRONIC PAIN OF BOTH SHOULDERS: ICD-10-CM

## 2024-01-04 DIAGNOSIS — M79.601 PAIN IN BOTH UPPER EXTREMITIES: ICD-10-CM

## 2024-01-04 DIAGNOSIS — M75.51 BILATERAL SHOULDER BURSITIS: ICD-10-CM

## 2024-01-04 DIAGNOSIS — M25.612 SHOULDER JOINT STIFFNESS, BILATERAL: ICD-10-CM

## 2024-01-04 DIAGNOSIS — M75.52 BILATERAL SHOULDER BURSITIS: ICD-10-CM

## 2024-01-04 DIAGNOSIS — M79.602 PAIN IN BOTH UPPER EXTREMITIES: ICD-10-CM

## 2024-01-04 DIAGNOSIS — M25.511 CHRONIC PAIN OF BOTH SHOULDERS: ICD-10-CM

## 2024-01-04 DIAGNOSIS — M25.611 SHOULDER JOINT STIFFNESS, BILATERAL: ICD-10-CM

## 2024-01-05 ENCOUNTER — TELEPHONE (OUTPATIENT)
Dept: ORTHOPEDIC SURGERY | Facility: CLINIC | Age: 44
End: 2024-01-05
Payer: COMMERCIAL

## 2024-02-12 ENCOUNTER — OFFICE VISIT (OUTPATIENT)
Dept: ENDOCRINOLOGY | Facility: CLINIC | Age: 44
End: 2024-02-12
Payer: COMMERCIAL

## 2024-02-12 VITALS
HEIGHT: 69 IN | WEIGHT: 174.8 LBS | OXYGEN SATURATION: 98 % | BODY MASS INDEX: 25.89 KG/M2 | SYSTOLIC BLOOD PRESSURE: 104 MMHG | HEART RATE: 83 BPM | DIASTOLIC BLOOD PRESSURE: 74 MMHG

## 2024-02-12 DIAGNOSIS — E11.649 DIABETIC HYPOGLYCEMIA: ICD-10-CM

## 2024-02-12 DIAGNOSIS — E04.1 SOLITARY THYROID NODULE: Primary | ICD-10-CM

## 2024-02-12 PROCEDURE — 99213 OFFICE O/P EST LOW 20 MIN: CPT | Performed by: NURSE PRACTITIONER

## 2024-02-12 RX ORDER — ASPIRIN 81 MG/1
81 TABLET ORAL DAILY
COMMUNITY

## 2024-02-12 RX ORDER — CARVEDILOL 12.5 MG/1
12.5 TABLET ORAL 2 TIMES DAILY WITH MEALS
COMMUNITY

## 2024-02-12 RX ORDER — ACYCLOVIR 400 MG/1
1 TABLET ORAL
Qty: 3 EACH | Refills: 5 | Status: SHIPPED | OUTPATIENT
Start: 2024-02-12

## 2024-02-12 RX ORDER — ROSUVASTATIN CALCIUM 20 MG/1
20 TABLET, COATED ORAL DAILY
COMMUNITY

## 2024-02-21 ENCOUNTER — HOSPITAL ENCOUNTER (OUTPATIENT)
Dept: ULTRASOUND IMAGING | Facility: HOSPITAL | Age: 44
Discharge: HOME | End: 2024-02-21
Admitting: NURSE PRACTITIONER
Payer: COMMERCIAL

## 2024-02-21 PROCEDURE — 76536 US EXAM OF HEAD AND NECK: CPT

## 2024-02-22 PROCEDURE — 76536 US EXAM OF HEAD AND NECK: CPT | Performed by: RADIOLOGY

## 2024-03-07 ENCOUNTER — OFFICE VISIT (OUTPATIENT)
Dept: ORTHOPEDIC SURGERY | Facility: CLINIC | Age: 44
End: 2024-03-07
Payer: COMMERCIAL

## 2024-03-07 ENCOUNTER — HOSPITAL ENCOUNTER (OUTPATIENT)
Dept: GENERAL RADIOLOGY | Facility: HOSPITAL | Age: 44
Discharge: HOME | End: 2024-03-07
Admitting: FAMILY MEDICINE
Payer: COMMERCIAL

## 2024-03-07 VITALS
WEIGHT: 185 LBS | BODY MASS INDEX: 27.4 KG/M2 | DIASTOLIC BLOOD PRESSURE: 90 MMHG | OXYGEN SATURATION: 96 % | SYSTOLIC BLOOD PRESSURE: 137 MMHG | HEART RATE: 70 BPM | HEIGHT: 69 IN

## 2024-03-07 DIAGNOSIS — M25.611 SHOULDER JOINT STIFFNESS, BILATERAL: ICD-10-CM

## 2024-03-07 DIAGNOSIS — M75.52 BILATERAL SHOULDER BURSITIS: ICD-10-CM

## 2024-03-07 DIAGNOSIS — M79.10 MUSCLE PAIN: ICD-10-CM

## 2024-03-07 DIAGNOSIS — G25.89 SCAPULAR DYSKINESIS: ICD-10-CM

## 2024-03-07 DIAGNOSIS — M54.12 CERVICAL RADICULAR PAIN: ICD-10-CM

## 2024-03-07 DIAGNOSIS — M79.602 PAIN IN BOTH UPPER EXTREMITIES: ICD-10-CM

## 2024-03-07 DIAGNOSIS — M79.601 PAIN IN BOTH UPPER EXTREMITIES: ICD-10-CM

## 2024-03-07 DIAGNOSIS — M54.2 CHRONIC NECK PAIN: ICD-10-CM

## 2024-03-07 DIAGNOSIS — M62.838 MUSCLE SPASM: ICD-10-CM

## 2024-03-07 DIAGNOSIS — G89.29 CHRONIC PAIN OF BOTH SHOULDERS: Primary | ICD-10-CM

## 2024-03-07 DIAGNOSIS — M75.80 ROTATOR CUFF TENDINITIS, UNSPECIFIED LATERALITY: ICD-10-CM

## 2024-03-07 DIAGNOSIS — M25.612 SHOULDER JOINT STIFFNESS, BILATERAL: ICD-10-CM

## 2024-03-07 DIAGNOSIS — M25.512 CHRONIC PAIN OF BOTH SHOULDERS: Primary | ICD-10-CM

## 2024-03-07 DIAGNOSIS — G89.29 CHRONIC NECK PAIN: ICD-10-CM

## 2024-03-07 DIAGNOSIS — M75.51 BILATERAL SHOULDER BURSITIS: ICD-10-CM

## 2024-03-07 DIAGNOSIS — M25.511 CHRONIC PAIN OF BOTH SHOULDERS: Primary | ICD-10-CM

## 2024-03-07 PROCEDURE — 72050 X-RAY EXAM NECK SPINE 4/5VWS: CPT

## 2024-03-07 PROCEDURE — 99214 OFFICE O/P EST MOD 30 MIN: CPT | Performed by: FAMILY MEDICINE

## 2024-03-07 PROCEDURE — 72050 X-RAY EXAM NECK SPINE 4/5VWS: CPT | Performed by: RADIOLOGY

## 2024-03-07 RX ORDER — NITROGLYCERIN 0.4 MG/1
TABLET SUBLINGUAL
COMMUNITY

## 2024-03-07 RX ORDER — DICLOFENAC SODIUM 75 MG/1
75 TABLET, DELAYED RELEASE ORAL 2 TIMES DAILY
Qty: 60 TABLET | Refills: 2 | Status: SHIPPED | OUTPATIENT
Start: 2024-03-07

## 2024-03-07 RX ORDER — BUPROPION HYDROCHLORIDE 150 MG/1
150 TABLET ORAL DAILY
COMMUNITY
Start: 2023-12-14

## 2024-03-07 RX ORDER — SEMAGLUTIDE 0.68 MG/ML
INJECTION, SOLUTION SUBCUTANEOUS
COMMUNITY
Start: 2024-02-29

## 2024-03-07 RX ORDER — METHOCARBAMOL 500 MG/1
500 TABLET, FILM COATED ORAL EVERY 8 HOURS PRN
Qty: 90 TABLET | Refills: 1 | Status: SHIPPED | OUTPATIENT
Start: 2024-03-07

## 2024-03-15 DIAGNOSIS — M75.52 BILATERAL SHOULDER BURSITIS: ICD-10-CM

## 2024-03-15 DIAGNOSIS — M25.611 SHOULDER JOINT STIFFNESS, BILATERAL: ICD-10-CM

## 2024-03-15 DIAGNOSIS — M75.51 BILATERAL SHOULDER BURSITIS: ICD-10-CM

## 2024-03-15 DIAGNOSIS — M25.511 CHRONIC PAIN OF BOTH SHOULDERS: ICD-10-CM

## 2024-03-15 DIAGNOSIS — M79.602 PAIN IN BOTH UPPER EXTREMITIES: ICD-10-CM

## 2024-03-15 DIAGNOSIS — M79.601 PAIN IN BOTH UPPER EXTREMITIES: ICD-10-CM

## 2024-03-15 DIAGNOSIS — G89.29 CHRONIC PAIN OF BOTH SHOULDERS: ICD-10-CM

## 2024-03-15 DIAGNOSIS — M25.512 CHRONIC PAIN OF BOTH SHOULDERS: ICD-10-CM

## 2024-03-15 DIAGNOSIS — M25.612 SHOULDER JOINT STIFFNESS, BILATERAL: ICD-10-CM

## 2024-03-18 NOTE — PROGRESS NOTES
"Follow Up Visit      Patient: Deborah Baez  YOB: 1980  Date of Encounter: 03/07/2024  PCP: Raissa Ro MD  Referring Provider: No ref. provider found     Subjective   Deborah Baez is a 43 y.o. female who presents to the office today for evaluation of Follow-up and Pain of the Right Shoulder and Follow-up and Pain of the Left Shoulder      Chief Complaint   Patient presents with    Right Shoulder - Follow-up, Pain    Left Shoulder - Follow-up, Pain       HPI  Presents for follow-up for pain of bilateral shoulders.  Reports no improvement.  She is in constant pain radiating from the shoulders to the fingertips worse on the right than the left oral diclofenac.  Has been doing her home exercises as best she can and topical diclofenac have not been of significant help  Patient Active Problem List   Diagnosis    Solitary thyroid nodule    Diabetic hypoglycemia       Past Medical History:   Diagnosis Date    Anxiety and depression     Brain injury     Diabetes     Hypertension        Allergies   Allergen Reactions    Lamictal [Lamotrigine] Hives       Vitals:   /90 (BP Location: Right arm, Patient Position: Sitting, Cuff Size: Adult)   Pulse 70   Ht 175.3 cm (69.02\")   Wt 83.9 kg (185 lb)   SpO2 96%   BMI 27.31 kg/m²           Review of Systems   Constitutional:  Positive for activity change. Negative for fever.   Respiratory:  Negative for shortness of breath and wheezing.    Cardiovascular:  Negative for chest pain.   Musculoskeletal:  Positive for arthralgias and myalgias. Negative for neck pain.   Skin:  Negative for color change and wound.   Neurological:  Negative for weakness and numbness.       Visit Vitals  /90 (BP Location: Right arm, Patient Position: Sitting, Cuff Size: Adult)   Pulse 70   Ht 175.3 cm (69.02\")   Wt 83.9 kg (185 lb)   SpO2 96%   BMI 27.31 kg/m²     43 y.o.female  Physical Exam  Vitals and nursing note reviewed.   Constitutional:       General: She is " not in acute distress.     Appearance: Normal appearance.   Pulmonary:      Effort: Pulmonary effort is normal. No respiratory distress.   Musculoskeletal:      Right shoulder: Tenderness present. Decreased range of motion.      Left shoulder: Tenderness present. Decreased range of motion.        Arms:       Cervical back: Spasms present. No tenderness. Decreased range of motion.      Comments: Hunched kyphotic head forward posture    Nontender cervical spine.  Mildly restricted range of motion and positive Spurling    Dyskinetic scapular motion worse on the right  Soreness to palpation  of proximal bilateral arm to palpation without rash erythema or swelling. All pain on resisted testing is felt in this area.  Bilateral shoulders restricted range of motion in flexion abduction and external rotation with endrange pain.  Tender on anterior shoulders bilaterally and upper arms. Tender left supraspinatus muscle belly, levator and rhomboid.  Painful testing of rotator cuff, [place] sign, AC crossover and impingement signs, biceps and triceps bilaterally with all pain felt in the lateral/anterior arm and deltoid distribution.  No weakness.  Intact pinch  and intrinsic strength and sensation     Skin:     General: Skin is warm and dry.      Findings: No erythema.   Neurological:      General: No focal deficit present.      Mental Status: She is alert.      Sensory: No sensory deficit.      Motor: No weakness.         Radiology Results:        Assessment & Plan   Diagnoses and all orders for this visit:    1. Chronic pain of both shoulders (Primary)  -     diclofenac (VOLTAREN) 75 MG EC tablet; Take 1 tablet by mouth 2 (Two) Times a Day.  Dispense: 60 tablet; Refill: 2  -     methocarbamol (ROBAXIN) 500 MG tablet; Take 1 tablet by mouth Every 8 (Eight) Hours As Needed for Muscle Spasms.  Dispense: 90 tablet; Refill: 1  -     Ambulatory Referral to Physical Therapy Evaluate and treat    2. Shoulder joint stiffness,  bilateral  -     diclofenac (VOLTAREN) 75 MG EC tablet; Take 1 tablet by mouth 2 (Two) Times a Day.  Dispense: 60 tablet; Refill: 2  -     methocarbamol (ROBAXIN) 500 MG tablet; Take 1 tablet by mouth Every 8 (Eight) Hours As Needed for Muscle Spasms.  Dispense: 90 tablet; Refill: 1  -     Ambulatory Referral to Physical Therapy Evaluate and treat    3. Bilateral shoulder bursitis  -     diclofenac (VOLTAREN) 75 MG EC tablet; Take 1 tablet by mouth 2 (Two) Times a Day.  Dispense: 60 tablet; Refill: 2  -     methocarbamol (ROBAXIN) 500 MG tablet; Take 1 tablet by mouth Every 8 (Eight) Hours As Needed for Muscle Spasms.  Dispense: 90 tablet; Refill: 1  -     Ambulatory Referral to Physical Therapy Evaluate and treat    4. Pain in both upper extremities  -     diclofenac (VOLTAREN) 75 MG EC tablet; Take 1 tablet by mouth 2 (Two) Times a Day.  Dispense: 60 tablet; Refill: 2  -     methocarbamol (ROBAXIN) 500 MG tablet; Take 1 tablet by mouth Every 8 (Eight) Hours As Needed for Muscle Spasms.  Dispense: 90 tablet; Refill: 1  -     Ambulatory Referral to Physical Therapy Evaluate and treat    5. Scapular dyskinesis  -     methocarbamol (ROBAXIN) 500 MG tablet; Take 1 tablet by mouth Every 8 (Eight) Hours As Needed for Muscle Spasms.  Dispense: 90 tablet; Refill: 1  -     Ambulatory Referral to Physical Therapy Evaluate and treat    6. Muscle pain  -     methocarbamol (ROBAXIN) 500 MG tablet; Take 1 tablet by mouth Every 8 (Eight) Hours As Needed for Muscle Spasms.  Dispense: 90 tablet; Refill: 1  -     Ambulatory Referral to Physical Therapy Evaluate and treat    7. Muscle spasm  -     methocarbamol (ROBAXIN) 500 MG tablet; Take 1 tablet by mouth Every 8 (Eight) Hours As Needed for Muscle Spasms.  Dispense: 90 tablet; Refill: 1  -     Ambulatory Referral to Physical Therapy Evaluate and treat    8. Chronic neck pain  -     methocarbamol (ROBAXIN) 500 MG tablet; Take 1 tablet by mouth Every 8 (Eight) Hours As Needed for  Muscle Spasms.  Dispense: 90 tablet; Refill: 1  -     Ambulatory Referral to Physical Therapy Evaluate and treat    9. Cervical radicular pain  -     methocarbamol (ROBAXIN) 500 MG tablet; Take 1 tablet by mouth Every 8 (Eight) Hours As Needed for Muscle Spasms.  Dispense: 90 tablet; Refill: 1  -     Ambulatory Referral to Physical Therapy Evaluate and treat    10. Rotator cuff tendinitis, unspecified laterality  -     methocarbamol (ROBAXIN) 500 MG tablet; Take 1 tablet by mouth Every 8 (Eight) Hours As Needed for Muscle Spasms.  Dispense: 90 tablet; Refill: 1  -     Ambulatory Referral to Physical Therapy Evaluate and treat         MEDS ORDERED DURING VISIT:  New Medications Ordered This Visit   Medications    diclofenac (VOLTAREN) 75 MG EC tablet     Sig: Take 1 tablet by mouth 2 (Two) Times a Day.     Dispense:  60 tablet     Refill:  2    methocarbamol (ROBAXIN) 500 MG tablet     Sig: Take 1 tablet by mouth Every 8 (Eight) Hours As Needed for Muscle Spasms.     Dispense:  90 tablet     Refill:  1     MEDICATION ISSUES:  Discussed medication options and treatment plan of prescribed medication as well as the risks, benefits, and side effects including potential falls, possible impaired driving and metabolic adversities among others. Patient is agreeable to call the office with any worsening of symptoms or onset of side effects. Patient is agreeable to call 911 or go to the nearest ER should he/she begin having SI/HI.     Discussion:    1.  Still unsure of exact cause of shoulder pain  Recommend increasing diclofenac and doing a trial of methocarbamol.  Patient is also referred to PT.  Follow-up in 2 to 4 weeks.  Patient needs to get the neck x-ray which was previously ordered             This document has been electronically signed by Hayden Carreno DO   March 17, 2024 22:50 EDT    Dictated Utilizing Dragon Dictation: Part of this note may be an electronic transcription/translation of spoken language to printed  text using the Dragon Dictation System.

## 2024-03-27 ENCOUNTER — OFFICE VISIT (OUTPATIENT)
Dept: ORTHOPEDIC SURGERY | Facility: CLINIC | Age: 44
End: 2024-03-27
Payer: COMMERCIAL

## 2024-03-27 VITALS
HEIGHT: 69 IN | OXYGEN SATURATION: 98 % | DIASTOLIC BLOOD PRESSURE: 79 MMHG | HEART RATE: 80 BPM | SYSTOLIC BLOOD PRESSURE: 115 MMHG | BODY MASS INDEX: 27.4 KG/M2 | WEIGHT: 185 LBS

## 2024-03-27 DIAGNOSIS — M75.80 ROTATOR CUFF TENDINITIS, UNSPECIFIED LATERALITY: ICD-10-CM

## 2024-03-27 DIAGNOSIS — M25.511 CHRONIC PAIN OF BOTH SHOULDERS: Primary | ICD-10-CM

## 2024-03-27 DIAGNOSIS — M25.612 SHOULDER JOINT STIFFNESS, BILATERAL: ICD-10-CM

## 2024-03-27 DIAGNOSIS — G89.29 CHRONIC NECK PAIN: ICD-10-CM

## 2024-03-27 DIAGNOSIS — M79.602 PAIN IN BOTH UPPER EXTREMITIES: ICD-10-CM

## 2024-03-27 DIAGNOSIS — M75.52 BILATERAL SHOULDER BURSITIS: ICD-10-CM

## 2024-03-27 DIAGNOSIS — M54.12 CERVICAL RADICULAR PAIN: ICD-10-CM

## 2024-03-27 DIAGNOSIS — M54.2 CHRONIC NECK PAIN: ICD-10-CM

## 2024-03-27 DIAGNOSIS — M25.611 SHOULDER JOINT STIFFNESS, BILATERAL: ICD-10-CM

## 2024-03-27 DIAGNOSIS — G89.29 CHRONIC PAIN OF BOTH SHOULDERS: Primary | ICD-10-CM

## 2024-03-27 DIAGNOSIS — M75.51 BILATERAL SHOULDER BURSITIS: ICD-10-CM

## 2024-03-27 DIAGNOSIS — G25.89 SCAPULAR DYSKINESIS: ICD-10-CM

## 2024-03-27 DIAGNOSIS — M25.512 CHRONIC PAIN OF BOTH SHOULDERS: Primary | ICD-10-CM

## 2024-03-27 DIAGNOSIS — M79.10 MUSCLE PAIN: ICD-10-CM

## 2024-03-27 DIAGNOSIS — M62.838 MUSCLE SPASM: ICD-10-CM

## 2024-03-27 DIAGNOSIS — M50.30 DDD (DEGENERATIVE DISC DISEASE), CERVICAL: ICD-10-CM

## 2024-03-27 DIAGNOSIS — M79.601 PAIN IN BOTH UPPER EXTREMITIES: ICD-10-CM

## 2024-03-27 DIAGNOSIS — M75.90 SUPRASPINATUS TENDINITIS, UNSPECIFIED LATERALITY: ICD-10-CM

## 2024-03-27 PROCEDURE — 99213 OFFICE O/P EST LOW 20 MIN: CPT | Performed by: FAMILY MEDICINE

## 2024-03-27 NOTE — PROGRESS NOTES
"Follow Up Visit      Patient: Deborah Baez  YOB: 1980  Date of Encounter: 03/27/2024  PCP: Raissa Ro MD  Referring Provider: No ref. provider found     Subjective   Deborah Baez is a 43 y.o. female who presents to the office today for evaluation of Follow-up and Pain of the Right Shoulder and Follow-up and Pain of the Left Shoulder      Chief Complaint   Patient presents with    Right Shoulder - Follow-up, Pain    Left Shoulder - Follow-up, Pain       HPI  Patient presents for follow-up for bilateral shoulder pain which she states is unchanged.  She has still not started physical therapy and states it is scheduled for early April.  The new diclofenac medicine is helpful somewhat  Patient Active Problem List   Diagnosis    Solitary thyroid nodule    Diabetic hypoglycemia       Past Medical History:   Diagnosis Date    Anxiety and depression     Brain injury     Diabetes     Hypertension        Allergies   Allergen Reactions    Lamictal [Lamotrigine] Hives       Vitals:   /79 (BP Location: Left arm, Patient Position: Sitting, Cuff Size: Adult)   Pulse 80   Ht 175.3 cm (69.02\")   Wt 83.9 kg (185 lb)   SpO2 98%   BMI 27.31 kg/m²           Review of Systems   Constitutional:  Positive for activity change. Negative for fever.   Respiratory:  Negative for shortness of breath and wheezing.    Cardiovascular:  Negative for chest pain.   Musculoskeletal:  Positive for arthralgias and myalgias. Negative for neck pain.   Skin:  Negative for color change and wound.   Neurological:  Negative for weakness and numbness.       Visit Vitals  /79 (BP Location: Left arm, Patient Position: Sitting, Cuff Size: Adult)   Pulse 80   Ht 175.3 cm (69.02\")   Wt 83.9 kg (185 lb)   SpO2 98%   BMI 27.31 kg/m²     43 y.o.female  Physical Exam  Vitals and nursing note reviewed.   Constitutional:       General: She is not in acute distress.     Appearance: Normal appearance.   Pulmonary:      Effort: " Pulmonary effort is normal. No respiratory distress.   Musculoskeletal:      Right shoulder: Tenderness present. Decreased range of motion. Normal strength. Normal pulse.      Left shoulder: Tenderness present. Decreased range of motion. Normal strength. Normal pulse.        Arms:       Cervical back: Spasms present. No tenderness. Decreased range of motion.      Comments: Hunched kyphotic head forward posture    tender lower cervical spine.  Mildly restricted range of motion and positive Spurling causing radiation down to shoulders    Dyskinetic scapular motion worse on the right    Bilateral shoulders restricted range of motion in flexion abduction and external rotation with endrange pain.  Tender on anterior shoulders bilaterally and upper arms. Tender left supraspinatus muscle belly, levator and rhomboid.  Painful testing of rotator cuff, [place] sign, AC crossover and impingement signs, biceps and triceps bilaterally with all pain felt in the lateral/anterior arm and deltoid distribution.  No weakness.  Intact pinch  and intrinsic strength and sensation     Skin:     General: Skin is warm and dry.      Findings: No erythema.   Neurological:      General: No focal deficit present.      Mental Status: She is alert.      Sensory: No sensory deficit.      Motor: No weakness.         Radiology Results:      XR Spine Cervical Complete 4 or 5 View    Result Date: 3/7/2024  Degenerative change most pronounced in the lower cervical spine.     This report was finalized on 3/7/2024 4:38 PM by Davina Santos M.D..       Assessment & Plan   Diagnoses and all orders for this visit:    1. Chronic pain of both shoulders (Primary)    2. Shoulder joint stiffness, bilateral    3. Bilateral shoulder bursitis    4. Scapular dyskinesis    5. Muscle pain    6. Muscle spasm    7. Pain in both upper extremities    8. Chronic neck pain    9. Cervical radicular pain    10. Rotator cuff tendinitis, unspecified laterality    11.  Supraspinatus tendinitis, unspecified laterality    12. DDD (degenerative disc disease), cervical         MEDS ORDERED DURING VISIT:  No orders of the defined types were placed in this encounter.    MEDICATION ISSUES:  Discussed medication options and treatment plan of prescribed medication as well as the risks, benefits, and side effects including potential falls, possible impaired driving and metabolic adversities among others. Patient is agreeable to call the office with any worsening of symptoms or onset of side effects. Patient is agreeable to call 911 or go to the nearest ER should he/she begin having SI/HI.     Discussion:  Patient needs to start physical therapy and continue the current medications.  F/u after 6 weeks of PT. Consider OMT, mri neck, further medication changes.            This document has been electronically signed by Hayden Carreno DO   March 27, 2024 17:06 EDT    Dictated Utilizing Dragon Dictation: Part of this note may be an electronic transcription/translation of spoken language to printed text using the Dragon Dictation System.

## 2024-06-19 ENCOUNTER — OFFICE VISIT (OUTPATIENT)
Dept: ORTHOPEDIC SURGERY | Facility: CLINIC | Age: 44
End: 2024-06-19
Payer: COMMERCIAL

## 2024-06-19 VITALS
HEIGHT: 69 IN | OXYGEN SATURATION: 98 % | WEIGHT: 184 LBS | DIASTOLIC BLOOD PRESSURE: 73 MMHG | HEART RATE: 80 BPM | SYSTOLIC BLOOD PRESSURE: 116 MMHG | BODY MASS INDEX: 27.25 KG/M2

## 2024-06-19 DIAGNOSIS — M75.21 BICEPS TENDONITIS OF BOTH SHOULDERS: ICD-10-CM

## 2024-06-19 DIAGNOSIS — M25.612 SHOULDER JOINT STIFFNESS, BILATERAL: ICD-10-CM

## 2024-06-19 DIAGNOSIS — M25.611 SHOULDER JOINT STIFFNESS, BILATERAL: ICD-10-CM

## 2024-06-19 DIAGNOSIS — M75.90 SUPRASPINATUS TENDINITIS, UNSPECIFIED LATERALITY: ICD-10-CM

## 2024-06-19 DIAGNOSIS — G89.29 CHRONIC PAIN OF BOTH SHOULDERS: Primary | ICD-10-CM

## 2024-06-19 DIAGNOSIS — M54.2 CHRONIC NECK PAIN: ICD-10-CM

## 2024-06-19 DIAGNOSIS — M79.602 PAIN IN BOTH UPPER EXTREMITIES: ICD-10-CM

## 2024-06-19 DIAGNOSIS — M75.80 ROTATOR CUFF TENDINITIS, UNSPECIFIED LATERALITY: ICD-10-CM

## 2024-06-19 DIAGNOSIS — M75.52 BILATERAL SHOULDER BURSITIS: ICD-10-CM

## 2024-06-19 DIAGNOSIS — M75.22 BICEPS TENDONITIS OF BOTH SHOULDERS: ICD-10-CM

## 2024-06-19 DIAGNOSIS — M79.601 PAIN IN BOTH UPPER EXTREMITIES: ICD-10-CM

## 2024-06-19 DIAGNOSIS — M77.8 SHOULDER CAPSULITIS: ICD-10-CM

## 2024-06-19 DIAGNOSIS — M50.30 DDD (DEGENERATIVE DISC DISEASE), CERVICAL: ICD-10-CM

## 2024-06-19 DIAGNOSIS — M54.12 CERVICAL RADICULAR PAIN: ICD-10-CM

## 2024-06-19 DIAGNOSIS — M79.10 MUSCLE PAIN: ICD-10-CM

## 2024-06-19 DIAGNOSIS — G89.29 CHRONIC NECK PAIN: ICD-10-CM

## 2024-06-19 DIAGNOSIS — M25.511 CHRONIC PAIN OF BOTH SHOULDERS: Primary | ICD-10-CM

## 2024-06-19 DIAGNOSIS — M62.838 MUSCLE SPASM: ICD-10-CM

## 2024-06-19 DIAGNOSIS — G25.89 SCAPULAR DYSKINESIS: ICD-10-CM

## 2024-06-19 DIAGNOSIS — M75.51 BILATERAL SHOULDER BURSITIS: ICD-10-CM

## 2024-06-19 DIAGNOSIS — M25.512 CHRONIC PAIN OF BOTH SHOULDERS: Primary | ICD-10-CM

## 2024-06-19 PROCEDURE — 99214 OFFICE O/P EST MOD 30 MIN: CPT | Performed by: FAMILY MEDICINE

## 2024-06-19 NOTE — PROGRESS NOTES
"Follow Up Visit      Patient: Deborah Baez  YOB: 1980  Date of Encounter: 06/19/2024  PCP: Raissa Ro MD  Referring Provider: No ref. provider found     Subjective   Deborah Baez is a 44 y.o. female who presents to the office today for evaluation of Follow-up and Pain of the Right Shoulder and Follow-up and Pain of the Left Shoulder      Chief Complaint   Patient presents with    Right Shoulder - Follow-up, Pain    Left Shoulder - Follow-up, Pain       History of Present Illness    Patient presents for follow-up for neck and shoulder pain issues bilaterally which have been going on for more than a year now.  She has been in PT for about 2 months states that she is flared up today after the worked on her yesterday.  States that they have been working on her neck or and that this helps her shoulder pain as well but she flares up after.  Overall states symptoms are similar and she has not improved significantly.  Continues to get neck pain radiating down into both shoulders with stiffness of both shoulders and pain on use  Patient Active Problem List   Diagnosis    Solitary thyroid nodule    Diabetic hypoglycemia       Past Medical History:   Diagnosis Date    Anxiety and depression     Brain injury     Diabetes     Hypertension        Allergies   Allergen Reactions    Lamictal [Lamotrigine] Hives       Vitals:   /73 (BP Location: Left arm, Patient Position: Sitting, Cuff Size: Adult)   Pulse 80   Ht 175.3 cm (69.02\")   Wt 83.5 kg (184 lb)   SpO2 98%   BMI 27.16 kg/m²           Visit Vitals  /73 (BP Location: Left arm, Patient Position: Sitting, Cuff Size: Adult)   Pulse 80   Ht 175.3 cm (69.02\")   Wt 83.5 kg (184 lb)   SpO2 98%   BMI 27.16 kg/m²     44 y.o.female        Physical Exam    Physical Exam  Vitals and nursing note reviewed.   Constitutional:       General: She is not in acute distress.     Appearance: Normal appearance.   Pulmonary:      Effort: Pulmonary effort " is normal. No respiratory distress.   Musculoskeletal:      Right shoulder: Tenderness present. Decreased range of motion. Normal strength. Normal pulse.      Left shoulder: Tenderness present. Decreased range of motion. Normal strength. Normal pulse.        Arms:       Cervical back: Spasms present. No tenderness. Decreased range of motion.      Comments: Hunched kyphotic head forward posture    tender lower cervical spine.  Mildly restricted range of motion and negative Spurling     Dyskinetic scapular motion    Bilateral shoulders restricted range of motion in flexion abduction and external rotation with endrange pain.  Tender on anterior shoulders, AC,  bilaterally and upper arms. Tender left supraspinatus muscle belly, levator and rhomboid.  Painful testing of supraspinatus, subscapularis, [place] sign, AC crossover and impingement signs, biceps bilaterally with all pain felt in the lateral/anterior arm and deltoid distribution.  No weakness.  Intact pinch  and intrinsic strength and sensation     Skin:     General: Skin is warm and dry.      Findings: No erythema.   Neurological:      General: No focal deficit present.      Mental Status: She is alert.      Sensory: No sensory deficit.      Motor: No weakness.         Radiology Results:    No radiology results for the last 30 days.    Results  Narrative & Impression   PROCEDURE: XR SPINE CERVICAL COMPLETE 4 OR 5 VW-     HISTORY: bilateral upper arm pain, concern for radicular source;  M25.511-Pain in right shoulder; G89.29-Other chronic pain; M25.512-Pain  in left shoulder; M25.611-Stiffness of right shoulder, not elsewhere  classified; M25.612-Stiffness of left shoulder, not elsewhere  classified; M75.51-Bursitis of right shoulder; M75.52-Bursitis of left  shoulder; M79.601-Pain in right arm; M79.602-Pain in left arm     COMPARISON: None     FINDINGS: 5 views of the cervical spine were obtained. The prevertebral  soft tissues are normal. There is no  subluxation or fracture.   There is  loss of disc space height, anterior osteophyte formation and facet  arthropathy at the C5/6 level and to a lesser extent the C6/7 level.     IMPRESSION:  Degenerative change most pronounced in the lower cervical  spine.              This report was finalized on 3/7/2024 4:38 PM by Davina Santos M.D..        XRAY BILATERAL SHOULDERS     EXAM DATE:    10/11/2023 12:35 PM     CLINICAL HISTORY:    bilateral shoulder pain and stiffness since june. no injury;  M25.511-Pain in right shoulder; G89.29-Other chronic pain; M25.512-Pain  in left shoulder; M25.611-Stiffness of right shoulder, not elsewhere  classified; M25.612-Stiffness of left shoulder, not elsewhere  classified; M75.90-Shoulder lesion, unspecified, unspecified shoulder;  G25.89-Other specified extrapyramidal and movement disorders; M5     TECHNIQUE:    Two or more views of the bilateral shoulders.     COMPARISON:    No relevant prior studies available.     FINDINGS:    BONES/JOINTS:  Unremarkable.  No acute fracture.  No dislocation.    SOFT TISSUES:  Unremarkable.     IMPRESSION:    No acute findings in the bilateral shoulders.        This report was finalized on 10/11/2023 1:00 PM by Dr. Scar Roman MD.     Assessment & Plan     Diagnoses and all orders for this visit:    1. Chronic pain of both shoulders (Primary)  -     MRI Shoulder Right Without Contrast; Future  -     MRI Shoulder Left Without Contrast; Future    2. Shoulder joint stiffness, bilateral  -     MRI Shoulder Right Without Contrast; Future  -     MRI Shoulder Left Without Contrast; Future    3. Bilateral shoulder bursitis  -     MRI Shoulder Right Without Contrast; Future  -     MRI Shoulder Left Without Contrast; Future    4. Scapular dyskinesis  -     MRI Shoulder Right Without Contrast; Future  -     MRI Shoulder Left Without Contrast; Future    5. Muscle pain  -     MRI Shoulder Right Without Contrast; Future  -     MRI Shoulder Left Without  Contrast; Future    6. Muscle spasm  -     MRI Shoulder Right Without Contrast; Future  -     MRI Shoulder Left Without Contrast; Future    7. Pain in both upper extremities  -     MRI Shoulder Right Without Contrast; Future  -     MRI Shoulder Left Without Contrast; Future    8. Chronic neck pain    9. Cervical radicular pain    10. Rotator cuff tendinitis, unspecified laterality  -     MRI Shoulder Right Without Contrast; Future  -     MRI Shoulder Left Without Contrast; Future    11. Supraspinatus tendinitis, unspecified laterality  -     MRI Shoulder Right Without Contrast; Future  -     MRI Shoulder Left Without Contrast; Future    12. DDD (degenerative disc disease), cervical    13. Biceps tendonitis of both shoulders  -     MRI Shoulder Right Without Contrast; Future  -     MRI Shoulder Left Without Contrast; Future    14. Shoulder capsulitis  -     MRI Shoulder Right Without Contrast; Future  -     MRI Shoulder Left Without Contrast; Future          MEDS ORDERED DURING VISIT:  No orders of the defined types were placed in this encounter.    MEDICATION ISSUES:  Discussed medication options and treatment plan of prescribed medication as well as the risks, benefits, and side effects including potential falls, possible impaired driving and metabolic adversities among others. Patient is agreeable to call the office with any worsening of symptoms or onset of side effects. Patient is agreeable to call 911 or go to the nearest ER should he/she begin having SI/HI.     Discussion:  Patient continues to have neck and shoulder pain with degenerative changes on cervical spine x-ray.  Suspect that her shoulder pain issues are combination of local soft tissue pain possibly from rotator cuff or labrum injury or adhesive capsulitis with a radicular cervical component.  Recommend MRIs of both shoulders if she is not improving with treatment.  If shoulder MRIs do not give any insight we will need to get an MRI of the cervical  spine.  She is already failed 2 months of physical therapy.  Patient would also benefit from osteopathic manipulative treatment will follow-up for this    06/19/24   16:20 EDT

## 2024-07-12 ENCOUNTER — HOSPITAL ENCOUNTER (OUTPATIENT)
Dept: MRI IMAGING | Facility: HOSPITAL | Age: 44
Discharge: HOME | End: 2024-07-12
Payer: COMMERCIAL

## 2024-07-12 DIAGNOSIS — G25.89 SCAPULAR DYSKINESIS: ICD-10-CM

## 2024-07-12 DIAGNOSIS — M79.601 PAIN IN BOTH UPPER EXTREMITIES: ICD-10-CM

## 2024-07-12 DIAGNOSIS — M75.51 BILATERAL SHOULDER BURSITIS: ICD-10-CM

## 2024-07-12 DIAGNOSIS — M75.22 BICEPS TENDONITIS OF BOTH SHOULDERS: ICD-10-CM

## 2024-07-12 DIAGNOSIS — G89.29 CHRONIC PAIN OF BOTH SHOULDERS: ICD-10-CM

## 2024-07-12 DIAGNOSIS — M75.80 ROTATOR CUFF TENDINITIS, UNSPECIFIED LATERALITY: ICD-10-CM

## 2024-07-12 DIAGNOSIS — M79.602 PAIN IN BOTH UPPER EXTREMITIES: ICD-10-CM

## 2024-07-12 DIAGNOSIS — M75.52 BILATERAL SHOULDER BURSITIS: ICD-10-CM

## 2024-07-12 DIAGNOSIS — M75.21 BICEPS TENDONITIS OF BOTH SHOULDERS: ICD-10-CM

## 2024-07-12 DIAGNOSIS — M62.838 MUSCLE SPASM: ICD-10-CM

## 2024-07-12 DIAGNOSIS — M25.611 SHOULDER JOINT STIFFNESS, BILATERAL: ICD-10-CM

## 2024-07-12 DIAGNOSIS — M25.512 CHRONIC PAIN OF BOTH SHOULDERS: ICD-10-CM

## 2024-07-12 DIAGNOSIS — M25.511 CHRONIC PAIN OF BOTH SHOULDERS: ICD-10-CM

## 2024-07-12 DIAGNOSIS — M77.8 SHOULDER CAPSULITIS: ICD-10-CM

## 2024-07-12 DIAGNOSIS — M25.612 SHOULDER JOINT STIFFNESS, BILATERAL: ICD-10-CM

## 2024-07-12 DIAGNOSIS — M75.90 SUPRASPINATUS TENDINITIS, UNSPECIFIED LATERALITY: ICD-10-CM

## 2024-07-12 DIAGNOSIS — M79.10 MUSCLE PAIN: ICD-10-CM

## 2024-07-12 PROCEDURE — 73221 MRI JOINT UPR EXTREM W/O DYE: CPT

## 2024-07-31 ENCOUNTER — OFFICE VISIT (OUTPATIENT)
Dept: ORTHOPEDIC SURGERY | Facility: CLINIC | Age: 44
End: 2024-07-31
Payer: COMMERCIAL

## 2024-07-31 VITALS
DIASTOLIC BLOOD PRESSURE: 86 MMHG | HEIGHT: 69 IN | BODY MASS INDEX: 27.25 KG/M2 | WEIGHT: 184 LBS | HEART RATE: 84 BPM | SYSTOLIC BLOOD PRESSURE: 114 MMHG | OXYGEN SATURATION: 99 %

## 2024-07-31 DIAGNOSIS — M62.838 MUSCLE SPASM: ICD-10-CM

## 2024-07-31 DIAGNOSIS — M75.52 BILATERAL SHOULDER BURSITIS: ICD-10-CM

## 2024-07-31 DIAGNOSIS — M75.90 SUPRASPINATUS TENDINITIS, UNSPECIFIED LATERALITY: ICD-10-CM

## 2024-07-31 DIAGNOSIS — M19.011 OSTEOARTHRITIS OF AC (ACROMIOCLAVICULAR) JOINTS, BILATERAL: ICD-10-CM

## 2024-07-31 DIAGNOSIS — G89.29 CHRONIC PAIN OF BOTH SHOULDERS: Primary | ICD-10-CM

## 2024-07-31 DIAGNOSIS — M75.101 TEAR OF RIGHT SUPRASPINATUS TENDON: ICD-10-CM

## 2024-07-31 DIAGNOSIS — M25.612 SHOULDER JOINT STIFFNESS, BILATERAL: ICD-10-CM

## 2024-07-31 DIAGNOSIS — M25.512 CHRONIC PAIN OF BOTH SHOULDERS: Primary | ICD-10-CM

## 2024-07-31 DIAGNOSIS — G25.89 SCAPULAR DYSKINESIS: ICD-10-CM

## 2024-07-31 DIAGNOSIS — M25.511 CHRONIC PAIN OF BOTH SHOULDERS: Primary | ICD-10-CM

## 2024-07-31 DIAGNOSIS — M79.10 MUSCLE PAIN: ICD-10-CM

## 2024-07-31 DIAGNOSIS — M75.51 BILATERAL SHOULDER BURSITIS: ICD-10-CM

## 2024-07-31 DIAGNOSIS — M25.611 SHOULDER JOINT STIFFNESS, BILATERAL: ICD-10-CM

## 2024-07-31 DIAGNOSIS — M19.012 OSTEOARTHRITIS OF AC (ACROMIOCLAVICULAR) JOINTS, BILATERAL: ICD-10-CM

## 2024-07-31 DIAGNOSIS — R79.89 ELEVATED SERUM CREATININE: ICD-10-CM

## 2024-07-31 DIAGNOSIS — M75.102 TEAR OF LEFT SUPRASPINATUS TENDON: ICD-10-CM

## 2024-07-31 RX ORDER — NABUMETONE 500 MG/1
500 TABLET, FILM COATED ORAL 2 TIMES DAILY PRN
Qty: 60 TABLET | Refills: 1 | Status: SHIPPED | OUTPATIENT
Start: 2024-07-31

## 2024-07-31 RX ORDER — PANTOPRAZOLE SODIUM 20 MG/1
20 TABLET, DELAYED RELEASE ORAL DAILY
COMMUNITY

## 2024-07-31 RX ADMIN — METHYLPREDNISOLONE ACETATE 80 MG: 80 INJECTION, SUSPENSION INTRA-ARTICULAR; INTRALESIONAL; INTRAMUSCULAR; SOFT TISSUE at 14:37

## 2024-07-31 RX ADMIN — LIDOCAINE HYDROCHLORIDE 5 ML: 10 INJECTION, SOLUTION EPIDURAL; INFILTRATION; INTRACAUDAL; PERINEURAL at 14:37

## 2024-07-31 NOTE — PROGRESS NOTES
"Follow Up Visit      Patient: Deborah Baez  YOB: 1980  Date of Encounter: 07/31/2024  PCP: Raissa Ro MD  Referring Provider: No ref. provider found     Subjective   Deborah Baez is a 44 y.o. female who presents to the office today for evaluation of Follow-up and Pain of the Right Shoulder and Follow-up and Pain of the Left Shoulder      Chief Complaint   Patient presents with    Right Shoulder - Follow-up, Pain    Left Shoulder - Follow-up, Pain       History of Present Illness  Patient presents for follow-up for bilateral shoulder pain and stiffness.  Reports no change in her symptoms.  Had her MRIs and needs to go over results    Patient Active Problem List   Diagnosis    Solitary thyroid nodule    Diabetic hypoglycemia       Past Medical History:   Diagnosis Date    Anxiety and depression     Brain injury     Diabetes     Hypertension        Allergies   Allergen Reactions    Lamictal [Lamotrigine] Hives       Vitals:   /86 (BP Location: Right arm, Patient Position: Sitting, Cuff Size: Adult)   Pulse 84   Ht 175.3 cm (69.02\")   Wt 83.5 kg (184 lb)   SpO2 99%   BMI 27.16 kg/m²           Visit Vitals  /86 (BP Location: Right arm, Patient Position: Sitting, Cuff Size: Adult)   Pulse 84   Ht 175.3 cm (69.02\")   Wt 83.5 kg (184 lb)   SpO2 99%   BMI 27.16 kg/m²     44 y.o.female        Physical Exam    Physical Exam  Vitals and nursing note reviewed.   Constitutional:       General: She is not in acute distress.     Appearance: Normal appearance.   Pulmonary:      Effort: Pulmonary effort is normal. No respiratory distress.   Musculoskeletal:      Right shoulder: Tenderness present. Decreased range of motion. Normal strength. Normal pulse.      Left shoulder: Tenderness present. Decreased range of motion. Normal strength. Normal pulse.        Arms:       Cervical back: Spasms present. No tenderness. Decreased range of motion.      Comments: Hunched kyphotic head forward " posture    Dyskinetic scapular motion    Bilateral shoulders restricted range of motion in flexion abduction and external rotation with endrange pain.  Tender on anterior shoulders, AC,    Painful testing of supraspinatus,  [place] sign, AC crossover/shear and impingement signs,  No weakness.  Intact pinch  and intrinsic strength and sensation     Skin:     General: Skin is warm and dry.      Findings: No erythema.   Neurological:      General: No focal deficit present.      Mental Status: She is alert.      Sensory: No sensory deficit.      Motor: No weakness.         Radiology Results:    MRI Shoulder Right Without Contrast    Result Date: 7/12/2024  1.  Low-grade articular surface tear distal supraspinatus tendon at level of humeral insertion. No high-grade partial or full-thickness rotator cuff tear. 2.  Mild hypertrophic AC joint arthropathy with mild mass effect on the underlying supraspinatus tendon. 3.  No acute labral tear. 4.  No medullary bone contusion or fracture.   This report was finalized on 7/12/2024 3:59 PM by Dr. Wilder Erwin MD.      MRI Shoulder Left Without Contrast    Result Date: 7/12/2024  1.  No acute labral tear. 2.  No medullary bone contusion or fracture. 3.  Partial-thickness articular surface tear distal supraspinatus tendon 7 mm in length at level of humeral insertion. No high-grade partial or full-thickness rotator cuff tear identified. 4.  Moderate hypertrophic AC joint arthropathy with inflammation, fluid, and periarticular edema of the left AC joint.   This report was finalized on 7/12/2024 2:39 PM by Dr. Wilder Erwin MD.       Results    - Large Joint Arthrocentesis: R subacromial bursa on 7/31/2024 2:37 PM  Indications: pain and diagnostic evaluation  Details: 22 G needle, posterior approach  Medications: 5 mL lidocaine PF 1% 1 %; 80 mg methylPREDNISolone acetate 80 MG/ML  Outcome: tolerated well, no immediate complications    Injection site in the posterior shoulder was  cleaned with alcohol and iodine.  Anesthesia with ethyl chloride.  Then using sterile technique the subacromial space was accessed with a 22-gauge 1.5 inch needle injected with 5 cc 1% lidocaine without epinephrine and 80 mg methylprednisolone.  Injection site was covered with sterile bandage.  There were no immediate adverse effects and negligible blood loss..  Follow-up care and precautions were discussed.      Procedure, treatment alternatives, risks and benefits explained, specific risks discussed. Consent was given by the patient. Immediately prior to procedure a time out was called to verify the correct patient, procedure, equipment, support staff and site/side marked as required. Patient was prepped and draped in the usual sterile fashion.          Assessment & Plan     Diagnoses and all orders for this visit:    1. Chronic pain of both shoulders (Primary)  -     nabumetone (RELAFEN) 500 MG tablet; Take 1 tablet by mouth 2 (Two) Times a Day As Needed for Mild Pain.  Dispense: 60 tablet; Refill: 1    2. Shoulder joint stiffness, bilateral  -     nabumetone (RELAFEN) 500 MG tablet; Take 1 tablet by mouth 2 (Two) Times a Day As Needed for Mild Pain.  Dispense: 60 tablet; Refill: 1    3. Bilateral shoulder bursitis  -     nabumetone (RELAFEN) 500 MG tablet; Take 1 tablet by mouth 2 (Two) Times a Day As Needed for Mild Pain.  Dispense: 60 tablet; Refill: 1    4. Scapular dyskinesis  -     nabumetone (RELAFEN) 500 MG tablet; Take 1 tablet by mouth 2 (Two) Times a Day As Needed for Mild Pain.  Dispense: 60 tablet; Refill: 1    5. Muscle pain  -     nabumetone (RELAFEN) 500 MG tablet; Take 1 tablet by mouth 2 (Two) Times a Day As Needed for Mild Pain.  Dispense: 60 tablet; Refill: 1    6. Muscle spasm  -     nabumetone (RELAFEN) 500 MG tablet; Take 1 tablet by mouth 2 (Two) Times a Day As Needed for Mild Pain.  Dispense: 60 tablet; Refill: 1    7. Supraspinatus tendinitis, unspecified laterality  -     nabumetone  (RELAFEN) 500 MG tablet; Take 1 tablet by mouth 2 (Two) Times a Day As Needed for Mild Pain.  Dispense: 60 tablet; Refill: 1    8. Tear of right supraspinatus tendon  -     nabumetone (RELAFEN) 500 MG tablet; Take 1 tablet by mouth 2 (Two) Times a Day As Needed for Mild Pain.  Dispense: 60 tablet; Refill: 1    9. Tear of left supraspinatus tendon  -     nabumetone (RELAFEN) 500 MG tablet; Take 1 tablet by mouth 2 (Two) Times a Day As Needed for Mild Pain.  Dispense: 60 tablet; Refill: 1    10. Osteoarthritis of AC (acromioclavicular) joints, bilateral  -     nabumetone (RELAFEN) 500 MG tablet; Take 1 tablet by mouth 2 (Two) Times a Day As Needed for Mild Pain.  Dispense: 60 tablet; Refill: 1    11. Elevated serum creatinine  -     Comprehensive Metabolic Panel; Future    Other orders  -     - Large Joint Arthrocentesis          MEDS ORDERED DURING VISIT:  No orders of the defined types were placed in this encounter.    MEDICATION ISSUES:  Discussed medication options and treatment plan of prescribed medication as well as the risks, benefits, and side effects including potential falls, possible impaired driving and metabolic adversities among others. Patient is agreeable to call the office with any worsening of symptoms or onset of side effects. Patient is agreeable to call 911 or go to the nearest ER should he/she begin having SI/HI.     Discussion:  Reviewed MRI of the both shoulder showing arthritic changes and partial supraspinatus tears.  Patient continues to have significant stiffness in the shoulders bilaterally.  Previously did not respond to a left shoulder subacromial injection.  Will try right-sided subacromial injection today and set the patient up for follow-up for left-sided AC joint injection with ultrasound guidance.  Discontinue diclofenac and will try nabumetone instead.  Need to get metabolic panel lab drawn for kidney function monitoring.      This document has been electronically signed by Hayden  DO Wai   July 31, 2024 14:12 EDT

## 2024-08-03 RX ORDER — LIDOCAINE HYDROCHLORIDE 10 MG/ML
5 INJECTION, SOLUTION EPIDURAL; INFILTRATION; INTRACAUDAL; PERINEURAL
Status: COMPLETED | OUTPATIENT
Start: 2024-07-31 | End: 2024-07-31

## 2024-08-03 RX ORDER — METHYLPREDNISOLONE ACETATE 80 MG/ML
80 INJECTION, SUSPENSION INTRA-ARTICULAR; INTRALESIONAL; INTRAMUSCULAR; SOFT TISSUE
Status: COMPLETED | OUTPATIENT
Start: 2024-07-31 | End: 2024-07-31

## 2024-08-26 ENCOUNTER — OFFICE VISIT (OUTPATIENT)
Dept: ORTHOPEDIC SURGERY | Facility: CLINIC | Age: 44
End: 2024-08-26
Payer: COMMERCIAL

## 2024-08-26 VITALS
OXYGEN SATURATION: 97 % | BODY MASS INDEX: 27.25 KG/M2 | HEART RATE: 83 BPM | DIASTOLIC BLOOD PRESSURE: 74 MMHG | HEIGHT: 69 IN | SYSTOLIC BLOOD PRESSURE: 107 MMHG | WEIGHT: 184 LBS

## 2024-08-26 DIAGNOSIS — G89.29 CHRONIC PAIN OF BOTH SHOULDERS: Primary | ICD-10-CM

## 2024-08-26 DIAGNOSIS — M25.611 SHOULDER JOINT STIFFNESS, BILATERAL: ICD-10-CM

## 2024-08-26 DIAGNOSIS — M25.512 CHRONIC PAIN OF BOTH SHOULDERS: Primary | ICD-10-CM

## 2024-08-26 DIAGNOSIS — M19.012 OSTEOARTHRITIS OF AC (ACROMIOCLAVICULAR) JOINTS, BILATERAL: ICD-10-CM

## 2024-08-26 DIAGNOSIS — M25.612 SHOULDER JOINT STIFFNESS, BILATERAL: ICD-10-CM

## 2024-08-26 DIAGNOSIS — M25.511 CHRONIC PAIN OF BOTH SHOULDERS: Primary | ICD-10-CM

## 2024-08-26 DIAGNOSIS — M19.011 OSTEOARTHRITIS OF AC (ACROMIOCLAVICULAR) JOINTS, BILATERAL: ICD-10-CM

## 2024-08-26 PROCEDURE — 20606 DRAIN/INJ JOINT/BURSA W/US: CPT | Performed by: FAMILY MEDICINE

## 2024-08-26 RX ORDER — TRIAMCINOLONE ACETONIDE 40 MG/ML
20 INJECTION, SUSPENSION INTRA-ARTICULAR; INTRAMUSCULAR
Status: COMPLETED | OUTPATIENT
Start: 2024-08-26 | End: 2024-08-26

## 2024-08-26 RX ORDER — LIDOCAINE HYDROCHLORIDE 10 MG/ML
5 INJECTION, SOLUTION EPIDURAL; INFILTRATION; INTRACAUDAL; PERINEURAL
Status: COMPLETED | OUTPATIENT
Start: 2024-08-26 | End: 2024-08-26

## 2024-08-26 RX ORDER — AMOXICILLIN 875 MG
TABLET ORAL
COMMUNITY
Start: 2024-08-19

## 2024-08-26 RX ADMIN — TRIAMCINOLONE ACETONIDE 20 MG: 40 INJECTION, SUSPENSION INTRA-ARTICULAR; INTRAMUSCULAR at 17:20

## 2024-08-26 RX ADMIN — LIDOCAINE HYDROCHLORIDE 5 ML: 10 INJECTION, SOLUTION EPIDURAL; INFILTRATION; INTRACAUDAL; PERINEURAL at 17:20

## 2024-08-26 NOTE — PROGRESS NOTES
"Follow Up Visit      Patient: Deborah Baez  YOB: 1980  Date of Encounter: 08/26/2024  PCP: Raissa Ro MD  Referring Provider: No ref. provider found     Subjective   Deborah Baez is a 44 y.o. female who presents to the office today for procedure    Chief Complaint   Patient presents with    Right Shoulder - Follow-up, Pain    Left Shoulder - Follow-up, Pain       History of Present Illness    Patient presents for ultrasound-guided left AC joint.  Notes she got no significant relief in the right shoulder from recent subacromial injection.  Denies any current illness or antibiotic use or fevers.  Allergies   Allergen Reactions    Lamictal [Lamotrigine] Hives       Vitals:   /74 (BP Location: Left arm, Patient Position: Sitting, Cuff Size: Adult)   Pulse 83   Ht 175.3 cm (69.02\")   Wt 83.5 kg (184 lb)   SpO2 97%   BMI 27.16 kg/m²           Physical Exam      Visit Vitals  /74 (BP Location: Left arm, Patient Position: Sitting, Cuff Size: Adult)   Pulse 83   Ht 175.3 cm (69.02\")   Wt 83.5 kg (184 lb)   SpO2 97%   BMI 27.16 kg/m²     44 y.o.female    - Medium Joint Arthrocentesis: L acromioclavicular on 8/26/2024 5:20 PM  Indications: pain, diagnostic evaluation and joint swelling  Details: 22 G needle, ultrasound-guided superior approach  Medications: 5 mL lidocaine PF 1% 1 %; 20 mg triamcinolone acetonide 40 MG/ML  Outcome: tolerated well, no immediate complications    AC joint was visualized using ultrasound.  The injection site was marked, and cleaned with alcohol and iodine.  Ethyl chloride spray was used for anesthesia, then 3 cc of 1% lidocaine without epinephrine was injected subcutaneously using a 1.5 inch 25-gauge needle for anesthesia.  Then using sterile technique and ultrasound guidance for accuracy, the AC joint was accessed using a 1.5 inch 22-gauge needle a mixture of 20 mg of triamcinolone and 1.5 cc of 1% lidocaine w/o epi was injected.  Injection site was " covered with a sterile bandage.  Follow-up care precautions were discussed.  There were no immediate adverse effects and negligible blood loss.  Procedure was technically difficult due to arthritic change      Procedure, treatment alternatives, risks and benefits explained, specific risks discussed. Consent was given by the patient. Immediately prior to procedure a time out was called to verify the correct patient, procedure, equipment, support staff and site/side marked as required. Patient was prepped and draped in the usual sterile fashion.           Assessment & Plan  Diagnoses and all orders for this visit:    1. Chronic pain of both shoulders (Primary)    2. Shoulder joint stiffness, bilateral    3. Osteoarthritis of AC (acromioclavicular) joints, bilateral    Other orders  -     - Medium Joint Arthrocentesis          MEDS ORDERED DURING VISIT:  No orders of the defined types were placed in this encounter.    MEDICATION ISSUES:  Discussed medication options and treatment plan of prescribed medication as well as the risks, benefits, and side effects including potential falls, possible impaired driving and metabolic adversities among others. Patient is agreeable to call the office with any worsening of symptoms or onset of side effects. Patient is agreeable to call 911 or go to the nearest ER should he/she begin having SI/HI.     Discussion:  Patient received left AC joint injection without adverse effect and will follow-up in 2 weeks for reevaluation      This document has been electronically signed by Hayden Carreno DO   August 26, 2024 17:21 EDT

## 2024-09-05 DIAGNOSIS — M75.51 BILATERAL SHOULDER BURSITIS: ICD-10-CM

## 2024-09-05 DIAGNOSIS — M25.511 CHRONIC PAIN OF BOTH SHOULDERS: ICD-10-CM

## 2024-09-05 DIAGNOSIS — M75.90 SUPRASPINATUS TENDINITIS, UNSPECIFIED LATERALITY: ICD-10-CM

## 2024-09-05 DIAGNOSIS — M25.612 SHOULDER JOINT STIFFNESS, BILATERAL: ICD-10-CM

## 2024-09-05 DIAGNOSIS — M75.101 TEAR OF RIGHT SUPRASPINATUS TENDON: ICD-10-CM

## 2024-09-05 DIAGNOSIS — M25.611 SHOULDER JOINT STIFFNESS, BILATERAL: ICD-10-CM

## 2024-09-05 DIAGNOSIS — M62.838 MUSCLE SPASM: ICD-10-CM

## 2024-09-05 DIAGNOSIS — G89.29 CHRONIC PAIN OF BOTH SHOULDERS: ICD-10-CM

## 2024-09-05 DIAGNOSIS — M19.011 OSTEOARTHRITIS OF AC (ACROMIOCLAVICULAR) JOINTS, BILATERAL: ICD-10-CM

## 2024-09-05 DIAGNOSIS — G25.89 SCAPULAR DYSKINESIS: ICD-10-CM

## 2024-09-05 DIAGNOSIS — M75.52 BILATERAL SHOULDER BURSITIS: ICD-10-CM

## 2024-09-05 DIAGNOSIS — M75.102 TEAR OF LEFT SUPRASPINATUS TENDON: ICD-10-CM

## 2024-09-05 DIAGNOSIS — M25.512 CHRONIC PAIN OF BOTH SHOULDERS: ICD-10-CM

## 2024-09-05 DIAGNOSIS — M79.10 MUSCLE PAIN: ICD-10-CM

## 2024-09-05 DIAGNOSIS — M19.012 OSTEOARTHRITIS OF AC (ACROMIOCLAVICULAR) JOINTS, BILATERAL: ICD-10-CM

## 2024-09-11 ENCOUNTER — OFFICE VISIT (OUTPATIENT)
Dept: ORTHOPEDIC SURGERY | Facility: CLINIC | Age: 44
End: 2024-09-11
Payer: COMMERCIAL

## 2024-09-11 VITALS
WEIGHT: 166 LBS | TEMPERATURE: 98 F | RESPIRATION RATE: 18 BRPM | HEIGHT: 69 IN | SYSTOLIC BLOOD PRESSURE: 101 MMHG | BODY MASS INDEX: 24.59 KG/M2 | HEART RATE: 95 BPM | DIASTOLIC BLOOD PRESSURE: 73 MMHG | OXYGEN SATURATION: 97 %

## 2024-09-11 DIAGNOSIS — M19.011 OSTEOARTHRITIS OF AC (ACROMIOCLAVICULAR) JOINTS, BILATERAL: ICD-10-CM

## 2024-09-11 DIAGNOSIS — G89.29 CHRONIC PAIN OF BOTH SHOULDERS: Primary | ICD-10-CM

## 2024-09-11 DIAGNOSIS — M25.611 SHOULDER JOINT STIFFNESS, BILATERAL: ICD-10-CM

## 2024-09-11 DIAGNOSIS — M75.52 BILATERAL SHOULDER BURSITIS: ICD-10-CM

## 2024-09-11 DIAGNOSIS — M25.512 CHRONIC PAIN OF BOTH SHOULDERS: Primary | ICD-10-CM

## 2024-09-11 DIAGNOSIS — M25.511 CHRONIC PAIN OF BOTH SHOULDERS: Primary | ICD-10-CM

## 2024-09-11 DIAGNOSIS — M25.612 SHOULDER JOINT STIFFNESS, BILATERAL: ICD-10-CM

## 2024-09-11 DIAGNOSIS — M75.51 BILATERAL SHOULDER BURSITIS: ICD-10-CM

## 2024-09-11 DIAGNOSIS — M19.012 OSTEOARTHRITIS OF AC (ACROMIOCLAVICULAR) JOINTS, BILATERAL: ICD-10-CM

## 2024-09-11 PROCEDURE — 99213 OFFICE O/P EST LOW 20 MIN: CPT | Performed by: FAMILY MEDICINE

## 2024-09-16 RX ORDER — NABUMETONE 500 MG/1
TABLET, FILM COATED ORAL
Qty: 60 TABLET | Refills: 1 | Status: SHIPPED | OUTPATIENT
Start: 2024-09-16

## 2024-09-25 ENCOUNTER — OFFICE VISIT (OUTPATIENT)
Dept: ORTHOPEDIC SURGERY | Facility: CLINIC | Age: 44
End: 2024-09-25
Payer: COMMERCIAL

## 2024-09-25 VITALS
HEART RATE: 75 BPM | OXYGEN SATURATION: 98 % | DIASTOLIC BLOOD PRESSURE: 91 MMHG | WEIGHT: 166 LBS | SYSTOLIC BLOOD PRESSURE: 141 MMHG | HEIGHT: 69 IN | BODY MASS INDEX: 24.59 KG/M2

## 2024-09-25 DIAGNOSIS — M25.512 CHRONIC PAIN OF BOTH SHOULDERS: ICD-10-CM

## 2024-09-25 DIAGNOSIS — G89.29 CHRONIC PAIN OF BOTH SHOULDERS: ICD-10-CM

## 2024-09-25 DIAGNOSIS — M19.012 OSTEOARTHRITIS OF AC (ACROMIOCLAVICULAR) JOINTS, BILATERAL: Primary | ICD-10-CM

## 2024-09-25 DIAGNOSIS — M19.011 OSTEOARTHRITIS OF AC (ACROMIOCLAVICULAR) JOINTS, BILATERAL: Primary | ICD-10-CM

## 2024-09-25 DIAGNOSIS — M25.511 CHRONIC PAIN OF BOTH SHOULDERS: ICD-10-CM

## 2024-09-25 DIAGNOSIS — M25.611 SHOULDER JOINT STIFFNESS, BILATERAL: ICD-10-CM

## 2024-09-25 DIAGNOSIS — M25.612 SHOULDER JOINT STIFFNESS, BILATERAL: ICD-10-CM

## 2024-09-25 PROCEDURE — 20606 DRAIN/INJ JOINT/BURSA W/US: CPT | Performed by: FAMILY MEDICINE

## 2024-09-25 RX ORDER — LIDOCAINE HYDROCHLORIDE 10 MG/ML
3 INJECTION, SOLUTION EPIDURAL; INFILTRATION; INTRACAUDAL; PERINEURAL
Status: COMPLETED | OUTPATIENT
Start: 2024-09-25 | End: 2024-09-25

## 2024-09-25 RX ORDER — TRIAMCINOLONE ACETONIDE 40 MG/ML
20 INJECTION, SUSPENSION INTRA-ARTICULAR; INTRAMUSCULAR
Status: COMPLETED | OUTPATIENT
Start: 2024-09-25 | End: 2024-09-25

## 2024-09-25 RX ADMIN — LIDOCAINE HYDROCHLORIDE 3 ML: 10 INJECTION, SOLUTION EPIDURAL; INFILTRATION; INTRACAUDAL; PERINEURAL at 16:41

## 2024-09-25 RX ADMIN — TRIAMCINOLONE ACETONIDE 20 MG: 40 INJECTION, SUSPENSION INTRA-ARTICULAR; INTRAMUSCULAR at 16:41

## 2024-10-02 ENCOUNTER — OFFICE VISIT (OUTPATIENT)
Dept: GASTROENTEROLOGY | Facility: CLINIC | Age: 44
End: 2024-10-02
Payer: COMMERCIAL

## 2024-10-02 VITALS
BODY MASS INDEX: 24.59 KG/M2 | HEART RATE: 80 BPM | WEIGHT: 166 LBS | DIASTOLIC BLOOD PRESSURE: 76 MMHG | HEIGHT: 69 IN | SYSTOLIC BLOOD PRESSURE: 112 MMHG

## 2024-10-02 DIAGNOSIS — R79.89 ELEVATED LFTS: ICD-10-CM

## 2024-10-02 DIAGNOSIS — K59.04 CHRONIC IDIOPATHIC CONSTIPATION: Primary | ICD-10-CM

## 2024-10-02 PROCEDURE — 85025 COMPLETE CBC W/AUTO DIFF WBC: CPT | Performed by: NURSE PRACTITIONER

## 2024-10-02 PROCEDURE — 86364 TISS TRNSGLTMNASE EA IG CLAS: CPT | Performed by: NURSE PRACTITIONER

## 2024-10-02 PROCEDURE — 82977 ASSAY OF GGT: CPT | Performed by: NURSE PRACTITIONER

## 2024-10-02 PROCEDURE — 86376 MICROSOMAL ANTIBODY EACH: CPT | Performed by: NURSE PRACTITIONER

## 2024-10-02 PROCEDURE — 84466 ASSAY OF TRANSFERRIN: CPT | Performed by: NURSE PRACTITIONER

## 2024-10-02 PROCEDURE — 82728 ASSAY OF FERRITIN: CPT | Performed by: NURSE PRACTITIONER

## 2024-10-02 PROCEDURE — 86381 MITOCHONDRIAL ANTIBODY EACH: CPT | Performed by: NURSE PRACTITIONER

## 2024-10-02 PROCEDURE — 80074 ACUTE HEPATITIS PANEL: CPT | Performed by: NURSE PRACTITIONER

## 2024-10-02 PROCEDURE — 83010 ASSAY OF HAPTOGLOBIN QUANT: CPT | Performed by: NURSE PRACTITIONER

## 2024-10-02 PROCEDURE — 86231 EMA EACH IG CLASS: CPT | Performed by: NURSE PRACTITIONER

## 2024-10-02 PROCEDURE — 82465 ASSAY BLD/SERUM CHOLESTEROL: CPT | Performed by: NURSE PRACTITIONER

## 2024-10-02 PROCEDURE — 86038 ANTINUCLEAR ANTIBODIES: CPT | Performed by: NURSE PRACTITIONER

## 2024-10-02 PROCEDURE — 82103 ALPHA-1-ANTITRYPSIN TOTAL: CPT | Performed by: NURSE PRACTITIONER

## 2024-10-02 PROCEDURE — 83540 ASSAY OF IRON: CPT | Performed by: NURSE PRACTITIONER

## 2024-10-02 PROCEDURE — 86258 DGP ANTIBODY EACH IG CLASS: CPT | Performed by: NURSE PRACTITIONER

## 2024-10-02 PROCEDURE — 82390 ASSAY OF CERULOPLASMIN: CPT | Performed by: NURSE PRACTITIONER

## 2024-10-02 PROCEDURE — 84478 ASSAY OF TRIGLYCERIDES: CPT | Performed by: NURSE PRACTITIONER

## 2024-10-02 PROCEDURE — 82172 ASSAY OF APOLIPOPROTEIN: CPT | Performed by: NURSE PRACTITIONER

## 2024-10-02 PROCEDURE — 86015 ACTIN ANTIBODY EACH: CPT | Performed by: NURSE PRACTITIONER

## 2024-10-02 PROCEDURE — 80053 COMPREHEN METABOLIC PANEL: CPT | Performed by: NURSE PRACTITIONER

## 2024-10-02 PROCEDURE — 82784 ASSAY IGA/IGD/IGG/IGM EACH: CPT | Performed by: NURSE PRACTITIONER

## 2024-10-02 PROCEDURE — 83883 ASSAY NEPHELOMETRY NOT SPEC: CPT | Performed by: NURSE PRACTITIONER

## 2024-10-02 NOTE — PROGRESS NOTES
DATE OF CONSULTATION:  10/2/2024    REASON FOR REFERRAL: Elevated LFTs    REFERRING PHYSICIAN:  Raissa Ro MD    CHIEF COMPLAINT:  Elevated LFTs    HISTORY OF PRESENT ILLNESS:   Deborah Baez is a very pleasant 44 y.o. female with history of obesity, diabetes mellitus type 2, HLD, hypertension and anxiety/depression who is being seen today at the request of Raissa Ro MD for evaluation and treatment of elevated LFTs.  Patient reports following with her PCP routinely with blood testing.  Patient reports being told she had fatty liver disease when she was a teenager and was recently made aware of her elevated LFTs.  Previous available labs were reviewed and patient has had mildly elevated LFTs since at least January 2023 with fluctuation.  On 2/28/2024 she had mildly elevated AST 62 and ALT 85 with normal total bilirubin and alkaline phosphatase.  Most recent CMP from 3/25/2024 showed further increase in  and  again with normal total bilirubin and alkaline phosphatase.  Patient reportedly had repeat CMP recently which showed LFTs had normalized.  However, results are currently unavailable to review.  Her platelet count has been normal.  She has had multiple abdominal ultrasounds as well as CT imaging since at least January 2023 which have reported liver as unremarkable.  Most recent CT abdomen pelvis without contrast from 3/22/2024 was unremarkable.  Patient reports being started on Ozempic approximately 2 years ago for diabetes mellitus type 2.  She was previously~323 lbs and is currently 166 lbs. Patient reports Ozempic dose has been reduced to maintenance which she is tolerating well.  Patient's hemoglobin A1c was 5.0 in June 2023.  Of note, she is s/p gastric bypass surgery as well as cholecystectomy.  She is taking Protonix 20 mg p.o. daily which is controlling GERD well.  Patient denies history of alcohol abuse.  She complains of constipation not improved with over-the-counter  stool softeners.  She is currently taking MiraLAX and reports having 1-2 BMs per week with stool type I-VII on Cannon stool scale.  She has associated abdominal bloating and incomplete evacuation of her colon.  She reports she often has to strain with BMs.  She denies having melena or rectal bleeding.  She is without family history of colon cancer.  Patient reports having colonoscopy~2 years ago with Dr. Kim which was unremarkable.  She has no other complaints today.    PAST MEDICAL HISTORY:  Past Medical History:   Diagnosis Date    Anxiety and depression     Brain injury     Diabetes     Hypertension        PAST SURGICAL HISTORY:  Past Surgical History:   Procedure Laterality Date    ENDOMETRIAL ABLATION      GASTRIC BYPASS      OVARY SURGERY      TUMOR REMOVAL      low back       FAMILY HISTORY:  Family History   Problem Relation Age of Onset    Breast cancer Neg Hx        SOCIAL HISTORY:  Social History     Socioeconomic History    Marital status:    Tobacco Use    Smoking status: Never    Smokeless tobacco: Never   Vaping Use    Vaping status: Never Used   Substance and Sexual Activity    Alcohol use: Yes     Comment: Occasionally    Drug use: Never    Sexual activity: Defer       MEDICATIONS:  The current medication list was reviewed in the EMR    Current Outpatient Medications:     amoxicillin (AMOXIL) 875 MG tablet, TAKE 1 TABLET BY MOUTH TWO TIMES A DAY FOR INFECTION *FINISH ALL OF THIS MEDICATION* (Patient not taking: Reported on 9/25/2024), Disp: , Rfl:     aspirin 81 MG EC tablet, Take 1 tablet by mouth Daily., Disp: , Rfl:     buPROPion XL (WELLBUTRIN XL) 150 MG 24 hr tablet, Take 1 tablet by mouth Daily., Disp: , Rfl:     carvedilol (COREG) 12.5 MG tablet, Take 1 tablet by mouth 2 (Two) Times a Day With Meals., Disp: , Rfl:     Continuous Blood Gluc Sensor (Dexcom G7 Sensor) misc, Use 1 each Every 10 (Ten) Days., Disp: 3 each, Rfl: 5    Ergocalciferol (Vitamin D2) 50 MCG (2000 UT) tablet,  "Take  by mouth., Disp: , Rfl:     losartan (COZAAR) 50 MG tablet, Take 0.5 tablets by mouth Daily., Disp: , Rfl:     metFORMIN (GLUCOPHAGE) 1000 MG tablet, Take 1 tablet by mouth 2 (Two) Times a Day With Meals., Disp: , Rfl:     methocarbamol (ROBAXIN) 500 MG tablet, Take 1 tablet by mouth Every 8 (Eight) Hours As Needed for Muscle Spasms., Disp: 90 tablet, Rfl: 1    nabumetone (RELAFEN) 500 MG tablet, TAKE ONE TABLET BY MOUTH TWO TIMES A DAY FOR MILD PAIN, Disp: 60 tablet, Rfl: 1    nitroglycerin (NITROSTAT) 0.4 MG SL tablet, PLACE 1 TABLET UNDER THE TONGUE EVERY 5 MINUTES UP TO 3 TABLETS IN 15 MINUTES FOR CHEST PAIN. IF NO RELIEF GO TO THE EMERGENCY ROOM OR CALL 911, Disp: , Rfl:     Ozempic, 0.25 or 0.5 MG/DOSE, 2 MG/3ML solution pen-injector, INJECT 0.5 MG SUBCUTANEOUSLY EVERY WEEK, Disp: , Rfl:     pantoprazole (PROTONIX) 20 MG EC tablet, Take 1 tablet by mouth Daily., Disp: , Rfl:     rosuvastatin (CRESTOR) 20 MG tablet, Take 1 tablet by mouth Daily., Disp: , Rfl:     sertraline (ZOLOFT) 50 MG tablet, TAKE 1 TABLET BY MOUTH EVERY DAY FOR MOOD, Disp: , Rfl:     temazepam (RESTORIL) 30 MG capsule, Take 1 capsule by mouth At Night As Needed for Sleep., Disp: , Rfl:     traZODone (DESYREL) 50 MG tablet, Take 1 tablet by mouth Every Night., Disp: , Rfl:     ALLERGIES:    Allergies   Allergen Reactions    Lamictal [Lamotrigine] Hives     REVIEW OF SYSTEMS:    A comprehensive 14 point review of systems was performed.  Significant findings as mentioned above.  All other systems reviewed and are negative.      Physical Exam   Vital Signs: /76 (BP Location: Left arm, Patient Position: Sitting, Cuff Size: Small Adult)   Pulse 80   Ht 175.3 cm (69\")   Wt 75.3 kg (166 lb)   BMI 24.51 kg/m²    General: Well developed, well nourished, alert and oriented x 3, in no acute distress.   Head: ATNC   Eyes: PERRL, No evidence of conjunctivitis.   Nose: No nasal discharge.   Mouth: Oral mucosal membranes moist. No oral " ulceration or hemorrhages.   Neck: Neck supple. No thyromegaly. No JVD.   Lungs: Clear in all fields to A&P without rales, rhonchi or wheezing.   Heart:Regular rate and rhythm. No murmurs, rubs, or gallops.   Abdomen: Soft. Bowel sounds are normoactive. Nontender with palpation.  Extremities: No cyanosis or edema. Peripheral pulses palpable and equal bilaterally.   Neurologic: Grossly non-focal exam      IMAGING:   CT ABDOMEN PELVIS WO CONTRAST  Order: [id number]  Impression    Unremarkable. No adrenal nodule identified.                              Images reviewed, interpreted and dictated by Dr. Yimi Kolb MD  Narrative    CT OF THE ABDOMEN    HISTORY: Adrenal hypertension.    PROCEDURE:  Routine (5 mm thick) axial images were obtained from the  lung bases to below the iliac crest following oral contrast  administration. This study was performed with techniques to keep  radiation doses as low as reasonably achievable, (ALARA). Individualized  dose reduction techniques using automated exposure control or adjustment  of mA and/or kV according to the patient size were employed.    COMPARISON: CT the abdomen and pelvis of 1/12/2024    FINDINGS: The gallbladder has been removed. The solid abdominal organs  and visualized ureters are normal. Specifically, there is no apparent  adrenal nodule.  Exam End: 03/22/24 16:40    Specimen Collected: 03/22/24 22:03 Last Resulted: 03/22/24 22:06   Received From: [facility]  Result Received: 03/27/24 15:35         RECENT LABS:          ASSESSMENT & PLAN:  Deborah Baez is a very pleasant 44 y.o. female with    1.  Elevated LFTs:  -As above, patient has history of obesity, diabetes mellitus type 2, HLD and hypertension.  She was reportedly told she had fatty liver disease as a teenager and elevated LFTs are likely secondary to MASLD.   Previous available labs were reviewed and patient has had mildly elevated LFTs since at least January 2023 with fluctuation.  On  2/28/2024 she had mildly elevated AST 62 and ALT 85 with normal total bilirubin and alkaline phosphatase.  Most recent CMP from 3/25/2024 showed further increase in  and  again with normal total bilirubin and alkaline phosphatase.  Patient reportedly had recent repeat CMP which showed LFTs had normalized.  However, results are currently unavailable to review.  Her platelet count has been normal.    -She has had multiple abdominal ultrasounds as well as CT imaging since at least January 2023 which have reported liver as unremarkable.  Most recent CT abdomen pelvis without contrast from 3/22/2024 was unremarkable.  -Recommended tight control of the patient's blood sugar, cholesterol, triglycerides and weight as this is the best (and essentially only) way to intervene and prevent ongoing liver damage as much as possible.  -Patient understands the importance of following a healthy low-fat diet and was encouraged to incorporate exercise into lifestyle.  As above, patient has had substantial weight loss~150-160 lbs with GLP-1 therapy.  Encouraged continued efforts.  -To further evaluate elevated LFTs, will obtain additional workup today to evaluate for other potential causes.  Will also repeat ultrasound of the liver.    2.  Constipation:  -As above, constipation has not improved with over-the-counter stool softeners.  Discussed with patient how GLP-1 therapy is likely contributing.  Recommended trial of Linzess.  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 daily.  Will have her return to clinic in 8 to 12 weeks for symptom check.  In the interim, she was advised to notify clinic if she does not have improvement with this dose and we will plan to increase to 145 mcg p.o. daily if needed.    The patient was in agreement with the plan and all questions were answered to her satisfaction.     Thank you so much for allowing us to participate in  the care of Deborah Baez . Please do not hesitate to contact us with any questions or concerns.             Electronically Signed by: LUIS Ellis , October 2, 2024 09:10 EDT       CC:   MD Jia Garcia Gina Leanne, MD

## 2024-10-03 ENCOUNTER — PATIENT ROUNDING (BHMG ONLY) (OUTPATIENT)
Dept: GASTROENTEROLOGY | Facility: CLINIC | Age: 44
End: 2024-10-03
Payer: COMMERCIAL

## 2024-10-03 LAB
ALBUMIN SERPL-MCNC: 4.2 G/DL (ref 3.5–5.2)
ALBUMIN/GLOB SERPL: 2 G/DL
ALP SERPL-CCNC: 73 U/L (ref 39–117)
ALPHA1 GLOB MFR UR ELPH: 141 MG/DL (ref 90–200)
ALT SERPL W P-5'-P-CCNC: 18 U/L (ref 1–33)
ANION GAP SERPL CALCULATED.3IONS-SCNC: 8.1 MMOL/L (ref 5–15)
AST SERPL-CCNC: 17 U/L (ref 1–32)
BASOPHILS # BLD AUTO: 0.03 10*3/MM3 (ref 0–0.2)
BASOPHILS NFR BLD AUTO: 0.7 % (ref 0–1.5)
BILIRUB SERPL-MCNC: 0.6 MG/DL (ref 0–1.2)
BUN SERPL-MCNC: 17 MG/DL (ref 6–20)
BUN/CREAT SERPL: 21.5 (ref 7–25)
CALCIUM SPEC-SCNC: 9.4 MG/DL (ref 8.6–10.5)
CERULOPLASMIN SERPL-MCNC: 19 MG/DL (ref 19–39)
CHLORIDE SERPL-SCNC: 108 MMOL/L (ref 98–107)
CO2 SERPL-SCNC: 23.9 MMOL/L (ref 22–29)
CREAT SERPL-MCNC: 0.79 MG/DL (ref 0.57–1)
DEPRECATED RDW RBC AUTO: 43.7 FL (ref 37–54)
EGFRCR SERPLBLD CKD-EPI 2021: 94.1 ML/MIN/1.73
EOSINOPHIL # BLD AUTO: 0.1 10*3/MM3 (ref 0–0.4)
EOSINOPHIL NFR BLD AUTO: 2.2 % (ref 0.3–6.2)
ERYTHROCYTE [DISTWIDTH] IN BLOOD BY AUTOMATED COUNT: 12.3 % (ref 12.3–15.4)
FERRITIN SERPL-MCNC: 79.1 NG/ML (ref 13–150)
GLOBULIN UR ELPH-MCNC: 2.1 GM/DL
GLUCOSE SERPL-MCNC: 90 MG/DL (ref 65–99)
HAV IGM SERPL QL IA: NORMAL
HBV CORE IGM SERPL QL IA: NORMAL
HBV SURFACE AG SERPL QL IA: NORMAL
HCT VFR BLD AUTO: 36.1 % (ref 34–46.6)
HCV AB SER QL: NORMAL
HGB BLD-MCNC: 12 G/DL (ref 12–15.9)
IMM GRANULOCYTES # BLD AUTO: 0.02 10*3/MM3 (ref 0–0.05)
IMM GRANULOCYTES NFR BLD AUTO: 0.4 % (ref 0–0.5)
IRON 24H UR-MRATE: 121 MCG/DL (ref 37–145)
IRON SATN MFR SERPL: 29 % (ref 20–50)
LYMPHOCYTES # BLD AUTO: 1.16 10*3/MM3 (ref 0.7–3.1)
LYMPHOCYTES NFR BLD AUTO: 26 % (ref 19.6–45.3)
MCH RBC QN AUTO: 32.1 PG (ref 26.6–33)
MCHC RBC AUTO-ENTMCNC: 33.2 G/DL (ref 31.5–35.7)
MCV RBC AUTO: 96.5 FL (ref 79–97)
MONOCYTES # BLD AUTO: 0.34 10*3/MM3 (ref 0.1–0.9)
MONOCYTES NFR BLD AUTO: 7.6 % (ref 5–12)
NEUTROPHILS NFR BLD AUTO: 2.82 10*3/MM3 (ref 1.7–7)
NEUTROPHILS NFR BLD AUTO: 63.1 % (ref 42.7–76)
NRBC BLD AUTO-RTO: 0 /100 WBC (ref 0–0.2)
PLATELET # BLD AUTO: 311 10*3/MM3 (ref 140–450)
PMV BLD AUTO: 9.9 FL (ref 6–12)
POTASSIUM SERPL-SCNC: 4.1 MMOL/L (ref 3.5–5.2)
PROT SERPL-MCNC: 6.3 G/DL (ref 6–8.5)
RBC # BLD AUTO: 3.74 10*6/MM3 (ref 3.77–5.28)
SODIUM SERPL-SCNC: 140 MMOL/L (ref 136–145)
TIBC SERPL-MCNC: 417 MCG/DL (ref 298–536)
TRANSFERRIN SERPL-MCNC: 280 MG/DL (ref 200–360)
WBC NRBC COR # BLD AUTO: 4.47 10*3/MM3 (ref 3.4–10.8)

## 2024-10-04 LAB
ANA SER QL: NEGATIVE
LKM-1 AB SER-ACNC: <1 UNITS (ref 0–20)
MITOCHONDRIA M2 IGG SER-ACNC: <20 UNITS (ref 0–20)
SMA IGG SER-ACNC: 2 UNITS (ref 0–19)

## 2024-10-07 LAB
ENDOMYSIUM IGA SER QL: NEGATIVE
GLIADIN PEPTIDE IGA SER-ACNC: 2 UNITS (ref 0–19)
GLIADIN PEPTIDE IGG SER-ACNC: 2 UNITS (ref 0–19)
IGA SERPL-MCNC: 85 MG/DL (ref 87–352)
TTG IGA SER-ACNC: <2 U/ML (ref 0–3)
TTG IGG SER-ACNC: <2 U/ML (ref 0–5)

## 2024-10-08 LAB
A2 MACROGLOB SERPL-MCNC: 305 MG/DL (ref 110–276)
ALT SERPL W P-5'-P-CCNC: 22 IU/L (ref 0–40)
APO A-I SERPL-MCNC: 166 MG/DL (ref 116–209)
AST SERPL W P-5'-P-CCNC: 21 IU/L (ref 0–40)
BILIRUB SERPL-MCNC: 0.3 MG/DL (ref 0–1.2)
CHOLEST SERPL-MCNC: 166 MG/DL (ref 100–199)
FIBROSIS SCORING:: ABNORMAL
FIBROSIS STAGE SERPL QL: ABNORMAL
GGT SERPL-CCNC: 7 IU/L (ref 0–60)
GLUCOSE SERPL-MCNC: 83 MG/DL (ref 70–99)
HAPTOGLOB SERPL-MCNC: 130 MG/DL (ref 42–296)
LABORATORY COMMENT REPORT: ABNORMAL
LIVER FIBR SCORE SERPL CALC.FIBROSURE: 0.11 (ref 0–0.21)
LIVER STEATOSIS GRADE SERPL QL: ABNORMAL
LIVER STEATOSIS SCORE SERPL: 0.1 (ref 0–0.4)
NASH GRADE SERPL QL: ABNORMAL
NASH INTERPRETATION SERPL-IMP: ABNORMAL
NASH SCORE SERPL: 0 (ref 0–0.25)
NASH SCORING: ABNORMAL
STEATOSIS SCORING: ABNORMAL
TEST PERFORMANCE INFO SPEC: ABNORMAL
TEST PERFORMANCE INFO SPEC: ABNORMAL
TRIGL SERPL-MCNC: 70 MG/DL (ref 0–149)

## 2024-10-18 ENCOUNTER — TELEPHONE (OUTPATIENT)
Dept: GASTROENTEROLOGY | Facility: CLINIC | Age: 44
End: 2024-10-18
Payer: COMMERCIAL

## 2024-10-18 NOTE — TELEPHONE ENCOUNTER
Called MsSpencer Nestor with workup from 10-20 24.  CBC showed normal blood counts.  CMP showed normalized LFTs.  MCKAY FibroSure showed no steatosis or fibrosis.  The remaining workup was completely unremarkable.  Patient was advised to have repeat ultrasound of the liver as previously planned.  Will follow-up in clinic as scheduled.

## 2024-10-22 ENCOUNTER — HOSPITAL ENCOUNTER (OUTPATIENT)
Dept: ULTRASOUND IMAGING | Facility: HOSPITAL | Age: 44
Discharge: HOME | End: 2024-10-22
Admitting: NURSE PRACTITIONER
Payer: COMMERCIAL

## 2024-10-22 DIAGNOSIS — R79.89 ELEVATED LFTS: ICD-10-CM

## 2024-10-22 PROCEDURE — 76705 ECHO EXAM OF ABDOMEN: CPT

## 2024-10-22 PROCEDURE — 76705 ECHO EXAM OF ABDOMEN: CPT | Performed by: RADIOLOGY

## 2024-10-23 ENCOUNTER — TELEPHONE (OUTPATIENT)
Dept: GASTROENTEROLOGY | Facility: CLINIC | Age: 44
End: 2024-10-23
Payer: COMMERCIAL

## 2024-10-23 NOTE — TELEPHONE ENCOUNTER
Called patient with ultrasound of the liver results which showed coarse echotexture of the liver which can be seen with cirrhosis.  As previously discussed with patient, recent lab testing including CBC showed normal blood counts. CMP showed normalized LFTs. MCKAY FibroSure showed no steatosis or fibrosis. The remaining workup was completely unremarkable.  At present, clinically she is doing well.  Therefore, we will plan to follow-up in clinic as scheduled.  Will plan to repeat ultrasound of the liver in 6 months.  Patient's PCP was also notified of ultrasound findings.

## 2024-10-24 ENCOUNTER — OFFICE VISIT (OUTPATIENT)
Dept: ORTHOPEDIC SURGERY | Facility: CLINIC | Age: 44
End: 2024-10-24
Payer: COMMERCIAL

## 2024-10-24 VITALS
WEIGHT: 166 LBS | SYSTOLIC BLOOD PRESSURE: 108 MMHG | BODY MASS INDEX: 24.59 KG/M2 | OXYGEN SATURATION: 98 % | HEIGHT: 69 IN | DIASTOLIC BLOOD PRESSURE: 76 MMHG | HEART RATE: 65 BPM

## 2024-10-24 DIAGNOSIS — G89.29 CHRONIC PAIN OF BOTH SHOULDERS: ICD-10-CM

## 2024-10-24 DIAGNOSIS — G25.89 SCAPULAR DYSKINESIS: ICD-10-CM

## 2024-10-24 DIAGNOSIS — M75.101 TEAR OF RIGHT SUPRASPINATUS TENDON: ICD-10-CM

## 2024-10-24 DIAGNOSIS — M19.011 OSTEOARTHRITIS OF AC (ACROMIOCLAVICULAR) JOINTS, BILATERAL: Primary | ICD-10-CM

## 2024-10-24 DIAGNOSIS — M75.102 TEAR OF LEFT SUPRASPINATUS TENDON: ICD-10-CM

## 2024-10-24 DIAGNOSIS — M25.511 CHRONIC PAIN OF BOTH SHOULDERS: ICD-10-CM

## 2024-10-24 DIAGNOSIS — M75.52 BILATERAL SHOULDER BURSITIS: ICD-10-CM

## 2024-10-24 DIAGNOSIS — M75.51 BILATERAL SHOULDER BURSITIS: ICD-10-CM

## 2024-10-24 DIAGNOSIS — M25.612 SHOULDER JOINT STIFFNESS, BILATERAL: ICD-10-CM

## 2024-10-24 DIAGNOSIS — M79.10 MUSCLE PAIN: ICD-10-CM

## 2024-10-24 DIAGNOSIS — M19.012 OSTEOARTHRITIS OF AC (ACROMIOCLAVICULAR) JOINTS, BILATERAL: Primary | ICD-10-CM

## 2024-10-24 DIAGNOSIS — M62.838 MUSCLE SPASM: ICD-10-CM

## 2024-10-24 DIAGNOSIS — M75.90 SUPRASPINATUS TENDINITIS, UNSPECIFIED LATERALITY: ICD-10-CM

## 2024-10-24 DIAGNOSIS — M25.512 CHRONIC PAIN OF BOTH SHOULDERS: ICD-10-CM

## 2024-10-24 DIAGNOSIS — M25.611 SHOULDER JOINT STIFFNESS, BILATERAL: ICD-10-CM

## 2024-10-24 PROCEDURE — 99214 OFFICE O/P EST MOD 30 MIN: CPT | Performed by: FAMILY MEDICINE

## 2024-10-24 RX ORDER — LORAZEPAM 0.5 MG/1
0.5 TABLET ORAL
COMMUNITY
Start: 2024-10-10

## 2024-10-24 RX ORDER — BREXPIPRAZOLE 0.5 MG/1
0.5 TABLET ORAL DAILY
COMMUNITY
Start: 2024-10-10

## 2024-10-24 RX ORDER — NABUMETONE 500 MG/1
500-1000 TABLET, FILM COATED ORAL 2 TIMES DAILY PRN
Qty: 120 TABLET | Refills: 2 | Status: SHIPPED | OUTPATIENT
Start: 2024-10-24

## 2024-11-04 NOTE — PROGRESS NOTES
"Follow Up Visit      Patient: Deborah Baez  YOB: 1980  Date of Encounter: 10/24/2024  PCP: Raissa Ro MD  Referring Provider: No ref. provider found     Subjective   Deborah Baez is a 44 y.o. female who presents to the office today for evaluation of Follow-up and Pain of the Right Shoulder      Chief Complaint   Patient presents with    Right Shoulder - Follow-up, Pain       HPI  Patient presents follow-up for chronic right shoulder left shoulder pain after having recent AC joint injections with image guidance.  Notes that pain is improved in the right shoulder did well with the injection.  Left shoulder continues to do well.  However even with the improvements from the injections her pain is still 7/10 at baseline with significant stiffness  Patient Active Problem List   Diagnosis    Solitary thyroid nodule    Diabetic hypoglycemia    Osteoarthritis of AC (acromioclavicular) joints, bilateral    Tear of left supraspinatus tendon    Tear of right supraspinatus tendon    Chronic pain of both shoulders    Shoulder joint stiffness, bilateral       Past Medical History:   Diagnosis Date    Anxiety and depression     Brain injury     Diabetes     Hypertension        Allergies   Allergen Reactions    Lamictal [Lamotrigine] Hives       Vitals:   /76 (BP Location: Right arm, Patient Position: Sitting, Cuff Size: Adult)   Pulse 65   Ht 175.3 cm (69.02\")   Wt 75.3 kg (166 lb)   SpO2 98%   BMI 24.50 kg/m²   BMI is within normal parameters. No other follow-up for BMI required.           Visit Vitals  /76 (BP Location: Right arm, Patient Position: Sitting, Cuff Size: Adult)   Pulse 65   Ht 175.3 cm (69.02\")   Wt 75.3 kg (166 lb)   SpO2 98%   BMI 24.50 kg/m²     44 y.o.female  Physical Exam  Vitals and nursing note reviewed.   Constitutional:       General: She is not in acute distress.     Appearance: Normal appearance.   Pulmonary:      Effort: Pulmonary effort is normal. No " respiratory distress.   Musculoskeletal:      Right shoulder: Tenderness present. Decreased range of motion. Normal strength. Normal pulse.      Left shoulder: Tenderness present. Decreased range of motion. Normal strength. Normal pulse.        Arms:       Cervical back: Spasms present. No tenderness. Decreased range of motion.      Comments: Hunched kyphotic head forward posture    Dyskinetic scapular motion    Bilateral shoulders restricted range of motion in flexion abduction and external rotation with endrange pain.  Tender on anterior shoulders, AC,    Painful testing of supraspinatus,  McDermitt sign, AC crossover/shear and impingement signs,  No weakness.  Intact pinch  and intrinsic strength and sensation.  Overall pain intensity is mildly improved on both shoulders after injections and mildly improved range of motion      Skin:     General: Skin is warm and dry.      Findings: No erythema.   Neurological:      General: No focal deficit present.      Mental Status: She is alert.      Sensory: No sensory deficit.      Motor: No weakness.         Radiology Results:    US Liver    Result Date: 10/22/2024  Coarse echotexture of the liver which can be seen with cirrhosis.     This report was finalized on 10/22/2024 4:47 PM by Davina Santos M.D..       Assessment & Plan   Diagnoses and all orders for this visit:    1. Osteoarthritis of AC (acromioclavicular) joints, bilateral (Primary)  -     Ambulatory Referral to Orthopedic Surgery  -     nabumetone (RELAFEN) 500 MG tablet; Take 1-2 tablets by mouth 2 (Two) Times a Day As Needed for Moderate Pain (with food).  Dispense: 120 tablet; Refill: 2    2. Chronic pain of both shoulders  -     Ambulatory Referral to Orthopedic Surgery  -     nabumetone (RELAFEN) 500 MG tablet; Take 1-2 tablets by mouth 2 (Two) Times a Day As Needed for Moderate Pain (with food).  Dispense: 120 tablet; Refill: 2    3. Shoulder joint stiffness, bilateral  -     Ambulatory Referral  to Orthopedic Surgery  -     nabumetone (RELAFEN) 500 MG tablet; Take 1-2 tablets by mouth 2 (Two) Times a Day As Needed for Moderate Pain (with food).  Dispense: 120 tablet; Refill: 2    4. Bilateral shoulder bursitis  -     Ambulatory Referral to Orthopedic Surgery  -     nabumetone (RELAFEN) 500 MG tablet; Take 1-2 tablets by mouth 2 (Two) Times a Day As Needed for Moderate Pain (with food).  Dispense: 120 tablet; Refill: 2    5. Scapular dyskinesis  -     Ambulatory Referral to Orthopedic Surgery  -     nabumetone (RELAFEN) 500 MG tablet; Take 1-2 tablets by mouth 2 (Two) Times a Day As Needed for Moderate Pain (with food).  Dispense: 120 tablet; Refill: 2    6. Tear of right supraspinatus tendon  -     Ambulatory Referral to Orthopedic Surgery  -     nabumetone (RELAFEN) 500 MG tablet; Take 1-2 tablets by mouth 2 (Two) Times a Day As Needed for Moderate Pain (with food).  Dispense: 120 tablet; Refill: 2    7. Tear of left supraspinatus tendon  -     Ambulatory Referral to Orthopedic Surgery  -     nabumetone (RELAFEN) 500 MG tablet; Take 1-2 tablets by mouth 2 (Two) Times a Day As Needed for Moderate Pain (with food).  Dispense: 120 tablet; Refill: 2    8. Supraspinatus tendinitis, unspecified laterality  -     Ambulatory Referral to Orthopedic Surgery  -     nabumetone (RELAFEN) 500 MG tablet; Take 1-2 tablets by mouth 2 (Two) Times a Day As Needed for Moderate Pain (with food).  Dispense: 120 tablet; Refill: 2    9. Muscle pain  -     Ambulatory Referral to Orthopedic Surgery  -     nabumetone (RELAFEN) 500 MG tablet; Take 1-2 tablets by mouth 2 (Two) Times a Day As Needed for Moderate Pain (with food).  Dispense: 120 tablet; Refill: 2    10. Muscle spasm  -     Ambulatory Referral to Orthopedic Surgery  -     nabumetone (RELAFEN) 500 MG tablet; Take 1-2 tablets by mouth 2 (Two) Times a Day As Needed for Moderate Pain (with food).  Dispense: 120 tablet; Refill: 2         MEDS ORDERED DURING VISIT:  New  Medications Ordered This Visit   Medications    nabumetone (RELAFEN) 500 MG tablet     Sig: Take 1-2 tablets by mouth 2 (Two) Times a Day As Needed for Moderate Pain (with food).     Dispense:  120 tablet     Refill:  2     MEDICATION ISSUES:  Discussed medication options and treatment plan of prescribed medication as well as the risks, benefits, and side effects including potential falls, possible impaired driving and metabolic adversities among others. Patient is agreeable to call the office with any worsening of symptoms or onset of side effects. Patient is agreeable to call 911 or go to the nearest ER should he/she begin having SI/HI.     Discussion:  Patient has gotten some improvement after AC joint injections however she continues to have significant shoulder pain and stiffness and desires to be evaluated by a surgeon.  She is generally failed conservative management of her arthritis and partial rotator cuff tears..  Nabumetone is refilled and patient may take up to 1000 mg twice a day             This document has been electronically signed by Hayden Carreno DO   November 4, 2024 08:49 EST    Dictated Utilizing Dragon Dictation: Part of this note may be an electronic transcription/translation of spoken language to printed text using the Dragon Dictation System.

## 2024-11-06 ENCOUNTER — OFFICE VISIT (OUTPATIENT)
Age: 44
End: 2024-11-06
Payer: COMMERCIAL

## 2024-11-06 VITALS
SYSTOLIC BLOOD PRESSURE: 116 MMHG | DIASTOLIC BLOOD PRESSURE: 78 MMHG | WEIGHT: 169.4 LBS | BODY MASS INDEX: 25.09 KG/M2 | HEIGHT: 69 IN

## 2024-11-06 DIAGNOSIS — M75.101 TEAR OF RIGHT SUPRASPINATUS TENDON: ICD-10-CM

## 2024-11-06 DIAGNOSIS — M25.511 CHRONIC PAIN OF BOTH SHOULDERS: ICD-10-CM

## 2024-11-06 DIAGNOSIS — G89.29 CHRONIC PAIN OF BOTH SHOULDERS: ICD-10-CM

## 2024-11-06 DIAGNOSIS — E11.65 TYPE 2 DIABETES MELLITUS WITH HYPERGLYCEMIA, WITHOUT LONG-TERM CURRENT USE OF INSULIN: ICD-10-CM

## 2024-11-06 DIAGNOSIS — I10 HYPERTENSION, UNSPECIFIED TYPE: ICD-10-CM

## 2024-11-06 DIAGNOSIS — M25.512 CHRONIC PAIN OF BOTH SHOULDERS: ICD-10-CM

## 2024-11-06 DIAGNOSIS — M19.012 OSTEOARTHRITIS OF AC (ACROMIOCLAVICULAR) JOINTS, BILATERAL: Primary | ICD-10-CM

## 2024-11-06 DIAGNOSIS — M19.011 OSTEOARTHRITIS OF AC (ACROMIOCLAVICULAR) JOINTS, BILATERAL: Primary | ICD-10-CM

## 2024-11-06 DIAGNOSIS — M75.102 TEAR OF LEFT SUPRASPINATUS TENDON: ICD-10-CM

## 2024-11-06 RX ORDER — SEMAGLUTIDE 1.34 MG/ML
INJECTION, SOLUTION SUBCUTANEOUS
COMMUNITY
Start: 2024-10-31

## 2024-11-06 RX ORDER — CARVEDILOL 3.12 MG/1
TABLET ORAL
COMMUNITY
Start: 2024-10-31

## 2024-11-06 RX ORDER — PANTOPRAZOLE SODIUM 40 MG/1
TABLET, DELAYED RELEASE ORAL
COMMUNITY
Start: 2024-10-31

## 2024-11-06 NOTE — PROGRESS NOTES
[facility] Note        Subjective     Pain (Bilateral shoulder pain)      HPI    Deborah Baez is a 44 y.o. female.  She is a new patient to me referred for bilateral shoulder pain.  She has had some shoulder pain since July 2023.  She is a .  No injury.  Left shoulder worse than right.  She is right-hand dominant.  She has had NSAIDs physical therapy at [facility] in [place] and she has had cortisone injections to her shoulders and to her AC joints.  The AC joint injection gave her about 1 day of relief.  Physical therapy and medications have not helped.  She was referred here to discuss surgical treatment.  She has seen Dr. Carreno.  I have read Dr. Keith's notes.    Past Medical History:   Diagnosis Date    Anxiety and depression     Arthritis of back 07/2023    Brain injury     Diabetes     Hypertension     Periarthritis of shoulder 07/2023    Rotator cuff syndrome 07/2023      Past Surgical History:   Procedure Laterality Date    ENDOMETRIAL ABLATION      GASTRIC BYPASS      OVARY SURGERY      TUMOR REMOVAL      low back      Family History   Problem Relation Age of Onset    Diabetes Mother     Cancer Maternal Grandfather     Breast cancer Neg Hx      Social History     Socioeconomic History    Marital status:    Tobacco Use    Smoking status: Never    Smokeless tobacco: Never   Vaping Use    Vaping status: Never Used   Substance and Sexual Activity    Alcohol use: Not Currently     Comment: Occasionally    Drug use: Never    Sexual activity: Yes     Partners: Male     Birth control/protection: None      Current Outpatient Medications on File Prior to Visit   Medication Sig Dispense Refill    carvedilol (COREG) 3.125 MG tablet       Ozempic, 1 MG/DOSE, 4 MG/3ML solution pen-injector       pantoprazole (PROTONIX) 40 MG EC tablet       aspirin 81 MG EC tablet Take 1 tablet by mouth Daily.      Brexpiprazole (Rexulti) 0.5 MG tablet Take 0.5 mg by mouth Daily.       buPROPion XL (WELLBUTRIN XL) 150 MG 24 hr tablet Take 1 tablet by mouth Daily.      Continuous Blood Gluc Sensor (Dexcom G7 Sensor) misc Use 1 each Every 10 (Ten) Days. 3 each 5    Ergocalciferol (Vitamin D2) 50 MCG (2000 UT) tablet Take  by mouth.      linaclotide (Linzess) 72 MCG capsule capsule Take 1 capsule by mouth Every Morning Before Breakfast. Wait 30 mins before eating. 30 capsule 5    LORazepam (ATIVAN) 0.5 MG tablet Take 1 tablet by mouth.      losartan (COZAAR) 50 MG tablet Take 0.5 tablets by mouth Daily.      methocarbamol (ROBAXIN) 500 MG tablet Take 1 tablet by mouth Every 8 (Eight) Hours As Needed for Muscle Spasms. 90 tablet 1    nabumetone (RELAFEN) 500 MG tablet Take 1-2 tablets by mouth 2 (Two) Times a Day As Needed for Moderate Pain (with food). 120 tablet 2    nitroglycerin (NITROSTAT) 0.4 MG SL tablet PLACE 1 TABLET UNDER THE TONGUE EVERY 5 MINUTES UP TO 3 TABLETS IN 15 MINUTES FOR CHEST PAIN. IF NO RELIEF GO TO THE EMERGENCY ROOM OR CALL 911      rosuvastatin (CRESTOR) 20 MG tablet Take 1 tablet by mouth Daily.      sertraline (ZOLOFT) 50 MG tablet TAKE 1 TABLET BY MOUTH EVERY DAY FOR MOOD      temazepam (RESTORIL) 30 MG capsule Take 1 capsule by mouth At Night As Needed for Sleep.      traZODone (DESYREL) 50 MG tablet Take 1 tablet by mouth Every Night.      [DISCONTINUED] amoxicillin (AMOXIL) 875 MG tablet  (Patient not taking: Reported on 10/24/2024)      [DISCONTINUED] carvedilol (COREG) 12.5 MG tablet Take 1 tablet by mouth 2 (Two) Times a Day With Meals.      [DISCONTINUED] Ozempic, 0.25 or 0.5 MG/DOSE, 2 MG/3ML solution pen-injector INJECT 0.5 MG SUBCUTANEOUSLY EVERY WEEK      [DISCONTINUED] pantoprazole (PROTONIX) 20 MG EC tablet Take 1 tablet by mouth Daily.       No current facility-administered medications on file prior to visit.      Allergies   Allergen Reactions    Lamictal [Lamotrigine] Hives          Review of Systems   Constitutional: Negative.    HENT: Negative.     Eyes:  "Negative.    Respiratory: Negative.     Cardiovascular: Negative.    Gastrointestinal: Negative.    Endocrine: Negative.    Genitourinary: Negative.    Musculoskeletal:  Positive for arthralgias.   Skin: Negative.    Allergic/Immunologic: Negative.    Neurological: Negative.    Hematological: Negative.    Psychiatric/Behavioral: Negative.          I reviewed the patient's chief complaint, history of present illness, review of systems, past medical history, surgical history, family history, social history, medications and allergy list.        Objective      Physical Exam  /78   Ht 174.6 cm (68.75\")   Wt 76.8 kg (169 lb 6.4 oz)   BMI 25.20 kg/m²     Body mass index is 25.2 kg/m².    General  Mental Status - alert  General Appearance - cooperative, well groomed, not in acute distress  Orientation - Oriented X3  Build & Nutrition - well developed and well nourished  Posture - normal posture  Gait - normal gait       Ortho Exam  Bilateral shoulders have about 140 degrees of forward elevation flexion and abduction.  Normal internal and external rotation.  Positive crossarm adduction test with tender AC joints.    Imaging/Studies Reviewed and Interpreted:  Imaging Results (Last 24 Hours)       ** No results found for the last 24 hours. **          I viewed and personally interpreted the shoulder radiographs from October 2023 which show arthritic changes at the AC joint.  I have viewed and personally interpreted the shoulder MRIs from July 2024 which show partial rotator cuff tears and significant AC joint arthropathy in both shoulders    Assessment    Assessment:  1. Osteoarthritis of AC (acromioclavicular) joints, bilateral    2. Chronic pain of both shoulders    3. Tear of left supraspinatus tendon    4. Tear of right supraspinatus tendon    5. Hypertension, unspecified type    6. Type 2 diabetes mellitus with hyperglycemia, without long-term current use of insulin        Plan:  The plan is for left shoulder " arthroscopy with rotator cuff repair and distal clavicle excision.  She has failed nonoperative treatment to include its NSAIDs physical therapy and cortisone injections.  Cortisone injection gave her temporary relief to the AC joint.Treatment options and alternatives were discussed with patient and family.  Surgical risks include but are not limited to pain, bleeding, infection, failure to relieve symptoms, need for further procedures, recurrence of symptoms, damage to healthy adjacent structures, hardware loosening/failure, stiffness, weakness, scar, blood clots/DVT/PE, loss of limb or life. We also discussed the postoperative protocol and expected outcome although no guarantees are possible with surgery. All questions were answered; the patient would like to proceed with surgical intervention.  She is a .  She understands she will be in a sling for a month with a rotator cuff repair and she will do physical therapy close to home there in [place].  She has diabetes and hypertension.        Michael Jennings MD  11/06/24  11:13 EST      Dictated Utilizing Dragon Dictation.

## 2024-11-21 ENCOUNTER — TELEPHONE (OUTPATIENT)
Dept: ORTHOPEDIC SURGERY | Facility: CLINIC | Age: 44
End: 2024-11-21

## 2024-11-21 NOTE — TELEPHONE ENCOUNTER
Hub staff attempted to follow warm transfer process and was unsuccessful     Caller: SURYA    Relationship to patient: SELF    Best call back number: [phone]    Patient is needing: RECEIVED A MSG TO RETURN JESUSITA'S CALL-

## 2024-12-18 ENCOUNTER — TELEPHONE (OUTPATIENT)
Dept: ORTHOPEDIC SURGERY | Facility: CLINIC | Age: 44
End: 2024-12-18
Payer: COMMERCIAL

## 2024-12-18 NOTE — TELEPHONE ENCOUNTER
I called patient and gave arrival time for surgery. Their arrival time for surgery. Arrival time for their surgery is 6:00am. Surgery location is [facility] at [address] [place] [address] first floor. NPO after midnight.

## 2024-12-19 ENCOUNTER — OUTSIDE FACILITY SERVICE (OUTPATIENT)
Dept: ORTHOPEDIC SURGERY | Facility: CLINIC | Age: 44
End: 2024-12-19
Payer: COMMERCIAL

## 2024-12-19 DIAGNOSIS — Z98.890 S/P ARTHROSCOPY OF LEFT SHOULDER: Primary | ICD-10-CM

## 2024-12-19 RX ORDER — OXYCODONE AND ACETAMINOPHEN 5; 325 MG/1; MG/1
1 TABLET ORAL EVERY 6 HOURS PRN
Qty: 20 TABLET | Refills: 0 | Status: SHIPPED | OUTPATIENT
Start: 2024-12-19

## 2024-12-19 RX ORDER — ONDANSETRON 4 MG/1
4 TABLET, FILM COATED ORAL EVERY 8 HOURS PRN
Qty: 10 TABLET | Refills: 1 | Status: SHIPPED | OUTPATIENT
Start: 2024-12-19

## 2024-12-20 ENCOUNTER — TELEPHONE (OUTPATIENT)
Dept: ORTHOPEDIC SURGERY | Facility: CLINIC | Age: 44
End: 2024-12-20
Payer: COMMERCIAL

## 2024-12-20 NOTE — TELEPHONE ENCOUNTER
I spoke with the patient to let her know she can resume wearing a bra once she feels comfortable as long as it is not on the incision. The patient verbalized understanding.     Asia Garcia

## 2024-12-20 NOTE — TELEPHONE ENCOUNTER
Provider: WILSON    Caller: Deborah Baez    Relationship to Patient: Self    Pharmacy: NA    Phone Number: [phone] (home)       Reason for Call: PATIENT WANTS TO KNOW WHEN SHE CAN RESUME WEARING A BRA    When was the patient last seen: SX 12.19.24

## 2024-12-23 ENCOUNTER — TRANSCRIBE ORDERS (OUTPATIENT)
Dept: ADMINISTRATIVE | Facility: HOSPITAL | Age: 44
End: 2024-12-23
Payer: COMMERCIAL

## 2024-12-23 DIAGNOSIS — E11.9 TYPE 2 DIABETES MELLITUS WITHOUT COMPLICATION, UNSPECIFIED WHETHER LONG TERM INSULIN USE: Primary | ICD-10-CM

## 2025-01-03 ENCOUNTER — OFFICE VISIT (OUTPATIENT)
Dept: ORTHOPEDIC SURGERY | Facility: CLINIC | Age: 45
End: 2025-01-03
Payer: COMMERCIAL

## 2025-01-03 VITALS — TEMPERATURE: 96.6 F

## 2025-01-03 DIAGNOSIS — Z98.890 S/P ARTHROSCOPY OF LEFT SHOULDER: Primary | ICD-10-CM

## 2025-01-03 NOTE — PROGRESS NOTES
Chief Complaint   Patient presents with    Post-op     2 week s/p S/P arthroscopy of left shoulder DOS (12-19-24)     She had cuff repair and distal clavicle excision      HPI  She is doing well without new complaint other than some itching.      Vitals:    01/03/25 1059   Temp: 96.6 °F (35.9 °C)         Physical Exam:  Left shoulder incisions have healed well.  She is neurologic intact          ICD-10-CM ICD-9-CM   1. S/P arthroscopy of left shoulder  Z98.890 V45.89       Orders Placed This Encounter   Procedures    Ambulatory Referral to Physical Therapy for Evaluation & Treatment       She will start physical therapy January 19 to work on range of motion.  She may discontinue the sling January 19.  She will return to work January 6 with restrictions no lifting left arm      Michael Jennings M.D., FAAOS  Orthopedic Surgeon  Fellowship Trained Sports Medicine  [facility]  Orthopedics and Sports Medicine  [address] [place] [address], [address]  [place] [address]      EMR Dragon/Transcription disclaimer:  Please note that portions of this document were completed with a voice recognition program.      At [facility], we believe that sharing information builds trust and better relationships. You are receiving this note because you are receiving care at [facility] or have recently visited. It is possible you will see health information before a provider has talked with you about it. This kind of information can be easy to misunderstand as it is a medical document. It is intended as qfix-lp-jutm communication. It is written in medical language and may contain abbreviations or verbiage that are unfamiliar. It may appear blunt or direct. Medical documents are intended to carry relevant information, facts as evident, and the clinical opinion of the practitioner.  To help you fully understand what it means for your health, we urge you to discuss this note with your provider.

## 2025-01-13 ENCOUNTER — OFFICE VISIT (OUTPATIENT)
Dept: GASTROENTEROLOGY | Facility: CLINIC | Age: 45
End: 2025-01-13
Payer: COMMERCIAL

## 2025-01-13 VITALS
SYSTOLIC BLOOD PRESSURE: 100 MMHG | HEART RATE: 78 BPM | DIASTOLIC BLOOD PRESSURE: 67 MMHG | BODY MASS INDEX: 26.36 KG/M2 | WEIGHT: 178 LBS | HEIGHT: 69 IN

## 2025-01-13 DIAGNOSIS — R79.89 ELEVATED LFTS: ICD-10-CM

## 2025-01-13 DIAGNOSIS — K75.81 METABOLIC DYSFUNCTION-ASSOCIATED STEATOHEPATITIS (MASH): ICD-10-CM

## 2025-01-13 DIAGNOSIS — K59.04 CHRONIC IDIOPATHIC CONSTIPATION: Primary | ICD-10-CM

## 2025-01-13 DIAGNOSIS — K74.69 OTHER CIRRHOSIS OF LIVER: ICD-10-CM

## 2025-01-13 RX ORDER — LUBIPROSTONE 24 UG/1
24 CAPSULE ORAL 2 TIMES DAILY WITH MEALS
Qty: 60 CAPSULE | Refills: 5 | Status: SHIPPED | OUTPATIENT
Start: 2025-01-13

## 2025-01-13 NOTE — PROGRESS NOTES
DATE:  1/13/2025    REASON FOR FOLLOW UP: Cirrhosis secondary to [facility]    REFERRING PHYSICIAN:  Dr. Ro    CHIEF COMPLAINT:  Follow up of cirrhosis     HISTORY OF PRESENT ILLNESS:   Deborah Baez is a very pleasant 44 y.o. female with history of obesity, diabetes mellitus type 2, HLD, hypertension and anxiety/depression who is being seen today at the request of Dr. Ro for evaluation and treatment of elevated LFTs.  Patient reports following with her PCP routinely with blood testing.  Patient reports being told she had fatty liver disease when she was a teenager and was recently made aware of her elevated LFTs.  Previous available labs were reviewed and patient has had mildly elevated LFTs since at least January 2023 with fluctuation.  On 2/28/2024 she had mildly elevated AST 62 and ALT 85 with normal total bilirubin and alkaline phosphatase.  Most recent CMP from 3/25/2024 showed further increase in  and  again with normal total bilirubin and alkaline phosphatase.  Patient reportedly had repeat CMP recently which showed LFTs had normalized.  However, results are currently unavailable to review.  Her platelet count has been normal.  She has had multiple abdominal ultrasounds as well as CT imaging since at least January 2023 which have reported liver as unremarkable.  Most recent CT abdomen pelvis without contrast from 3/22/2024 was unremarkable.  Patient reports being started on Ozempic approximately 2 years ago for diabetes mellitus type 2.  She was previously~323 lbs and is currently 166 lbs. Patient reports Ozempic dose has been reduced to maintenance which she is tolerating well.  Patient's hemoglobin A1c was 5.0 in June 2023.  Of note, she is s/p gastric bypass surgery as well as cholecystectomy.  She is taking Protonix 20 mg p.o. daily which is controlling GERD well.  Patient denies history of alcohol abuse.  She complains of constipation not improved with over-the-counter stool softeners.   She is currently taking MiraLAX and reports having 1-2 BMs per week with stool type I-VII on [place] stool scale.  She has associated abdominal bloating and incomplete evacuation of her colon.  She reports she often has to strain with BMs.  She denies having melena or rectal bleeding.  She is without family history of colon cancer.  Patient reports having colonoscopy~2 years ago with Dr. Kim which was unremarkable.  She has no other complaints today.    INTERVAL HISTORY:  Ms. Baez presents today for follow up. At her last visit, workup was obtained. CBC showed normal blood counts. CMP showed normalized LFTs. MCKAY FibroSure showed no steatosis or fibrosis. The remaining workup was completely unremarkable. She had repeat US liver on 10/22/24 which showed coarse echotexture of the liver which can be seen with cirrhosis. She continues to follow with her PCP routinely with blood testing. She had repeat CMP on 12/11/24  which showed ,  with normal Alkaline phosphatase and total bilirubin. Of note, she was previously on crestor which she says has been discontinued. She reports she will be having repeat labs with PCP in two weeks.  At present, clinically she is doing well in regards to cirrhosis.  She denies having abdominal swelling.  Denies mental status changes.  Denies hematemesis or melena.  She reports having left rotator cuff surgery on 12/19 with Dr. Jennings.  In regards to constipation, she was previously started on trial of Linzess 72 mcg p.o. daily which has been helpful.  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 With Linzess, she has been having 3-4 BMs per week with stool type 6 on BSS. She has no other complaints today.     PAST MEDICAL HISTORY:  Past Medical History:   Diagnosis Date    Anxiety and depression     Arthritis of back 07/2023    Brain injury     Diabetes     Hypertension      Periarthritis of shoulder 07/2023    Rotator cuff syndrome 07/2023       PAST SURGICAL HISTORY:  Past Surgical History:   Procedure Laterality Date    ENDOMETRIAL ABLATION      GASTRIC BYPASS      OVARY SURGERY      TUMOR REMOVAL      low back       FAMILY HISTORY:  Family History   Problem Relation Age of Onset    Diabetes Mother     Cancer Maternal Grandfather     Breast cancer Neg Hx        SOCIAL HISTORY:  Social History     Socioeconomic History    Marital status:    Tobacco Use    Smoking status: Never    Smokeless tobacco: Never   Vaping Use    Vaping status: Never Used   Substance and Sexual Activity    Alcohol use: Not Currently     Comment: Occasionally    Drug use: Never    Sexual activity: Yes     Partners: Male     Birth control/protection: None       MEDICATIONS:  The current medication list was reviewed in the EMR    Current Outpatient Medications:     aspirin 81 MG EC tablet, Take 1 tablet by mouth Daily., Disp: , Rfl:     Brexpiprazole (Rexulti) 0.5 MG tablet, Take 0.5 mg by mouth Daily., Disp: , Rfl:     buPROPion XL (WELLBUTRIN XL) 150 MG 24 hr tablet, Take 1 tablet by mouth Daily., Disp: , Rfl:     carvedilol (COREG) 3.125 MG tablet, , Disp: , Rfl:     Continuous Blood Gluc Sensor (Dexcom G7 Sensor) misc, Use 1 each Every 10 (Ten) Days., Disp: 3 each, Rfl: 5    Ergocalciferol (Vitamin D2) 50 MCG (2000 UT) tablet, Take  by mouth., Disp: , Rfl:     linaclotide (Linzess) 72 MCG capsule capsule, Take 1 capsule by mouth Every Morning Before Breakfast. Wait 30 mins before eating., Disp: 30 capsule, Rfl: 5    LORazepam (ATIVAN) 0.5 MG tablet, Take 1 tablet by mouth., Disp: , Rfl:     nabumetone (RELAFEN) 500 MG tablet, Take 1-2 tablets by mouth 2 (Two) Times a Day As Needed for Moderate Pain (with food)., Disp: 120 tablet, Rfl: 2    nitroglycerin (NITROSTAT) 0.4 MG SL tablet, PLACE 1 TABLET UNDER THE TONGUE EVERY 5 MINUTES UP TO 3 TABLETS IN 15 MINUTES FOR CHEST PAIN. IF NO RELIEF GO TO THE  "EMERGENCY ROOM OR CALL 911, Disp: , Rfl:     ondansetron (Zofran) 4 MG tablet, Take 1 tablet by mouth Every 8 (Eight) Hours As Needed for Nausea., Disp: 10 tablet, Rfl: 1    Ozempic, 1 MG/DOSE, 4 MG/3ML solution pen-injector, , Disp: , Rfl:     pantoprazole (PROTONIX) 40 MG EC tablet, , Disp: , Rfl:     sertraline (ZOLOFT) 50 MG tablet, TAKE 1 TABLET BY MOUTH EVERY DAY FOR MOOD, Disp: , Rfl:     temazepam (RESTORIL) 30 MG capsule, Take 1 capsule by mouth At Night As Needed for Sleep., Disp: , Rfl:     traZODone (DESYREL) 50 MG tablet, Take 1 tablet by mouth Every Night., Disp: , Rfl:     losartan (COZAAR) 50 MG tablet, Take 0.5 tablets by mouth Daily., Disp: , Rfl:     methocarbamol (ROBAXIN) 500 MG tablet, Take 1 tablet by mouth Every 8 (Eight) Hours As Needed for Muscle Spasms., Disp: 90 tablet, Rfl: 1    oxyCODONE-acetaminophen (PERCOCET) 5-325 MG per tablet, Take 1 tablet by mouth Every 6 (Six) Hours As Needed for Severe Pain., Disp: 20 tablet, Rfl: 0    rosuvastatin (CRESTOR) 20 MG tablet, Take 1 tablet by mouth Daily., Disp: , Rfl:     ALLERGIES:    Allergies   Allergen Reactions    Lamictal [Lamotrigine] Hives     REVIEW OF SYSTEMS:    A comprehensive 14 point review of systems was performed.  Significant findings as mentioned above.  All other systems reviewed and are negative.      Physical Exam   Vital Signs: /67 (BP Location: Left arm, Patient Position: Sitting, Cuff Size: Large Adult)   Pulse 78   Ht 174.6 cm (68.75\")   Wt 80.7 kg (178 lb)   BMI 26.48 kg/m²    General: Well developed, well nourished, alert and oriented x 3, in no acute distress.   Head: ATNC   Eyes: PERRL, No evidence of conjunctivitis.   Nose: No nasal discharge.   Mouth: Oral mucosal membranes moist. No oral ulceration or hemorrhages.   Neck: Neck supple. No thyromegaly. No JVD.   Lungs: Clear in all fields to A&P without rales, rhonchi or wheezing.   Heart:Regular rate and rhythm. No murmurs, rubs, or gallops.   Abdomen: " Soft. Bowel sounds are normoactive. Nontender with palpation.  Extremities: No cyanosis or edema. Peripheral pulses palpable and equal bilaterally.   Neurologic: Grossly non-focal exam    IMAGING:  10/22/24   Narrative & Impression   PROCEDURE: US LIVER-     HISTORY: Elevated liver enzymes / Fatty liver; R79.89-Other specified  abnormal findings of blood chemistry     COMPARISON: None.     TECHNIQUE: Sonographic images of the liver were obtained in the  transverse and sagittal planes with color flow and pulse Doppler.     FINDINGS: The liver is coarse in echotexture which can be seen with  cirrhosis. There is no evidence of a focal liver mass. The hepatic  vasculature is patent with normal directional flow. The common duct  measures 3.6 mm.     IMPRESSION:  Coarse echotexture of the liver which can be seen with  cirrhosis.              This report was finalized on 10/22/2024 4:47 PM by Davina Santos M.D..        CT ABDOMEN PELVIS WO CONTRAST  Order: [id number]  Impression    Unremarkable. No adrenal nodule identified.                              Images reviewed, interpreted and dictated by Dr. Yimi Kolb MD  Narrative    CT OF THE ABDOMEN    HISTORY: Adrenal hypertension.    PROCEDURE:  Routine (5 mm thick) axial images were obtained from the  lung bases to below the iliac crest following oral contrast  administration. This study was performed with techniques to keep  radiation doses as low as reasonably achievable, (ALARA). Individualized  dose reduction techniques using automated exposure control or adjustment  of mA and/or kV according to the patient size were employed.    COMPARISON: CT the abdomen and pelvis of 1/12/2024    FINDINGS: The gallbladder has been removed. The solid abdominal organs  and visualized ureters are normal. Specifically, there is no apparent  adrenal nodule.  Exam End: 03/22/24 16:40    Specimen Collected: 03/22/24 22:03 Last Resulted: 03/22/24 22:06   Received From: [facility]  [employer]  Result Received: 03/27/24 15:35         RECENT LABS:          ASSESSMENT & PLAN:  Deborah Baez is a very pleasant 44 y.o. female with    1.  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  She was reportedly told she had fatty liver disease as a teenager. She is s/p gastric bypass surgery and also on GLP-1 therapy. She was previously~323 lbs and currently weighs 178 lbs.   -Workup was obtained on 10/2/24. CBC showed normal blood counts. CMP showed normalized LFTs. MCKAY FibroSure showed no steatosis or fibrosis. The remaining workup was completely unremarkable. She had repeat US liver on 10/22/24 which showed coarse echotexture of the liver which can be seen with cirrhosis. She continues to follow with her PCP routinely with blood testing. She had repeat CMP on 12/11/24  which showed ,  with normal Alkaline phosphatase and total bilirubin. Of note, she was previously on crestor which she says has been discontinued. She reports she will be having repeat labs with PCP in two weeks. Will request.   -At present, clinically she is doing well and is well compensated.   -We discussed obtaining EGD to evaluate for esophageal varices. However, she is recovering from recent rotator cuff surgery. Therefore, will plan to discuss again with next follow up in 3 months.   -Again recommended tight control of the patient's blood sugar, cholesterol, triglycerides and weight as this is the best (and essentially only) way to intervene and prevent ongoing liver damage as much as possible.Patient understands the importance of following a healthy low-fat diet and was encouraged to incorporate exercise into lifestyle.  As above, patient has had substantial weight loss  with GLP-1 therapy.  Encouraged continued efforts.    2.  Constipation:  -As above, constipation has not improved with over-the-counter stool softeners.  Discussed with  patient how GLP-1 therapy is likely contributing.  Recommended trial of Linzess.  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 daily. This has been helpful as above but she finds this medication difficult to take while teaching. Therefore, will discontinue Linzess and start trial of Amitiza 24 mcg PO twice daily.      RTC in 3 months.     The patient was in agreement with the plan and all questions were answered to her satisfaction.     Thank you so much for allowing us to participate in the care of Deborah Baez . Please do not hesitate to contact us with any questions or concerns.         Electronically Signed by: LUIS Ellis , January 13, 2025 15:35 EST       CC:   Raissa Ro MD

## 2025-01-20 ENCOUNTER — HOSPITAL ENCOUNTER (OUTPATIENT)
Dept: NUCLEAR MEDICINE | Facility: HOSPITAL | Age: 45
Discharge: HOME | End: 2025-01-20
Payer: COMMERCIAL

## 2025-01-20 DIAGNOSIS — E11.9 TYPE 2 DIABETES MELLITUS WITHOUT COMPLICATION, UNSPECIFIED WHETHER LONG TERM INSULIN USE: ICD-10-CM

## 2025-01-20 PROCEDURE — A9541 TC99M SULFUR COLLOID: HCPCS | Performed by: INTERNAL MEDICINE

## 2025-01-20 PROCEDURE — 78264 GASTRIC EMPTYING IMG STUDY: CPT | Performed by: RADIOLOGY

## 2025-01-20 PROCEDURE — 78264 GASTRIC EMPTYING IMG STUDY: CPT

## 2025-01-20 PROCEDURE — 34310000005 TECHNETIUM SULFUR COLLOID: Performed by: INTERNAL MEDICINE

## 2025-01-20 RX ADMIN — TECHNETIUM TC 99M SULFUR COLLOID 1 DOSE: KIT at 13:15

## 2025-02-12 ENCOUNTER — OFFICE VISIT (OUTPATIENT)
Dept: ENDOCRINOLOGY | Facility: CLINIC | Age: 45
End: 2025-02-12
Payer: COMMERCIAL

## 2025-02-12 VITALS
OXYGEN SATURATION: 97 % | WEIGHT: 173 LBS | BODY MASS INDEX: 25.73 KG/M2 | DIASTOLIC BLOOD PRESSURE: 67 MMHG | SYSTOLIC BLOOD PRESSURE: 101 MMHG | HEART RATE: 83 BPM

## 2025-02-12 DIAGNOSIS — E04.1 SOLITARY THYROID NODULE: Primary | ICD-10-CM

## 2025-02-12 LAB
T3FREE SERPL-MCNC: 1.76 PG/ML (ref 2–4.4)
T4 FREE SERPL-MCNC: 1.19 NG/DL (ref 0.92–1.68)
TSH SERPL DL<=0.05 MIU/L-ACNC: 0.88 UIU/ML (ref 0.27–4.2)

## 2025-02-12 PROCEDURE — 84439 ASSAY OF FREE THYROXINE: CPT | Performed by: NURSE PRACTITIONER

## 2025-02-12 PROCEDURE — 84481 FREE ASSAY (FT-3): CPT | Performed by: NURSE PRACTITIONER

## 2025-02-12 PROCEDURE — 84443 ASSAY THYROID STIM HORMONE: CPT | Performed by: NURSE PRACTITIONER

## 2025-02-12 RX ORDER — ACYCLOVIR 400 MG/1
1 TABLET ORAL
Qty: 3 EACH | Refills: 11 | Status: SHIPPED | OUTPATIENT
Start: 2025-02-12

## 2025-02-12 NOTE — PROGRESS NOTES
Chief Complaint   Patient presents with    Follow-up     Thyroid        Referring Provider  No ref. provider found     HPI   Deborah Baez is a 44 y.o. female had concerns including Follow-up (Thyroid).   Thyroid Nodule.    She has some increased dysphagia with swallowing saliva or rice/breads.  She denies any SOA. She is not currently taking any thyroid medication.  She is overall doing well.      Thyroid History:  Her grandmother has a history of thyroid cancer. She was noted to have a nodule on US Thyroid.  She was sent here for further eval.    Birth state: [place]  Previous history of radiation to face/neck: none  Consuming foods high in iodine: none  Family history of thyroid complications: paternal aunts-thyroid replacement meds    The following portions of the patient's history were reviewed and updated as appropriate: allergies, current medications, past family history, past medical history, past social history, past surgical history and problem list.    US Thyroid: 02/21/2024  FINDINGS:    LEFT THYROID LOBE:  Left lobe of thyroid nodule measuring 1.2 cm that was previously not measured but is grossly stable.    RIGHT THYROID LOBE:  Unremarkable as visualized.  No enlarged or calcified nodules.    ISTHMUS:  Unremarkable as visualized.  No enlarged or calcified nodules.    LYMPH NODES:  Unremarkable as visualized.  No lymphadenopathy.  IMPRESSION:    Left lobe of thyroid nodule measuring 1.2 cm that was previously not measured but is grossly stable.    Past Medical History:   Diagnosis Date    Anxiety and depression     Arthritis of back 07/2023    Brain injury     Diabetes     Hypertension     Periarthritis of shoulder 07/2023    Rotator cuff syndrome 07/2023     Past Surgical History:   Procedure Laterality Date    ENDOMETRIAL ABLATION      GASTRIC BYPASS      OVARY SURGERY      ROTATOR CUFF REPAIR Left     TUMOR REMOVAL      low back      Family History   Problem Relation Age of Onset    Diabetes Mother      Cancer Maternal Grandfather     Breast cancer Neg Hx       Social History     Socioeconomic History    Marital status:    Tobacco Use    Smoking status: Never    Smokeless tobacco: Never   Vaping Use    Vaping status: Never Used   Substance and Sexual Activity    Alcohol use: Not Currently     Comment: Occasionally    Drug use: Never    Sexual activity: Yes     Partners: Male     Birth control/protection: None      Allergies   Allergen Reactions    Lamictal [Lamotrigine] Hives      Current Outpatient Medications on File Prior to Visit   Medication Sig Dispense Refill    aspirin 81 MG EC tablet Take 1 tablet by mouth Daily.      buPROPion XL (WELLBUTRIN XL) 150 MG 24 hr tablet Take 1 tablet by mouth Daily.      Ergocalciferol (Vitamin D2) 50 MCG (2000 UT) tablet Take  by mouth.      LORazepam (ATIVAN) 0.5 MG tablet Take 1 tablet by mouth.      losartan (COZAAR) 50 MG tablet Take 0.5 tablets by mouth Daily.      lubiprostone (Amitiza) 24 MCG capsule Take 1 capsule by mouth 2 (Two) Times a Day With Meals. 60 capsule 5    methocarbamol (ROBAXIN) 500 MG tablet Take 1 tablet by mouth Every 8 (Eight) Hours As Needed for Muscle Spasms. 90 tablet 1    nabumetone (RELAFEN) 500 MG tablet Take 1-2 tablets by mouth 2 (Two) Times a Day As Needed for Moderate Pain (with food). 120 tablet 2    nitroglycerin (NITROSTAT) 0.4 MG SL tablet PLACE 1 TABLET UNDER THE TONGUE EVERY 5 MINUTES UP TO 3 TABLETS IN 15 MINUTES FOR CHEST PAIN. IF NO RELIEF GO TO THE EMERGENCY ROOM OR CALL 911      ondansetron (Zofran) 4 MG tablet Take 1 tablet by mouth Every 8 (Eight) Hours As Needed for Nausea. 10 tablet 1    Ozempic, 1 MG/DOSE, 4 MG/3ML solution pen-injector       pantoprazole (PROTONIX) 40 MG EC tablet       rosuvastatin (CRESTOR) 20 MG tablet Take 1 tablet by mouth Daily.      sertraline (ZOLOFT) 50 MG tablet TAKE 1 TABLET BY MOUTH EVERY DAY FOR MOOD      temazepam (RESTORIL) 30 MG capsule Take 1 capsule by mouth At Night As Needed for  Sleep.      traZODone (DESYREL) 50 MG tablet Take 1 tablet by mouth Every Night.      Brexpiprazole (Rexulti) 0.5 MG tablet Take 0.5 mg by mouth Daily. (Patient not taking: Reported on 2/12/2025)      carvedilol (COREG) 3.125 MG tablet  (Patient not taking: Reported on 2/12/2025)      oxyCODONE-acetaminophen (PERCOCET) 5-325 MG per tablet Take 1 tablet by mouth Every 6 (Six) Hours As Needed for Severe Pain. (Patient not taking: Reported on 2/12/2025) 20 tablet 0    [DISCONTINUED] Continuous Blood Gluc Sensor (Dexcom G7 Sensor) misc Use 1 each Every 10 (Ten) Days. (Patient not taking: Reported on 2/12/2025) 3 each 5     No current facility-administered medications on file prior to visit.      Review of Systems   Constitutional: Negative.    Eyes: Negative.    Endocrine: Negative.    Skin: Negative.    Psychiatric/Behavioral: Negative.     All other systems reviewed and are negative.     /67 (BP Location: Right arm, Patient Position: Sitting, Cuff Size: Adult)   Pulse 83   Wt 78.5 kg (173 lb)   SpO2 97%   BMI 25.73 kg/m²      Physical Exam  Vitals reviewed.   Constitutional:       Appearance: Normal appearance.   Eyes:      Extraocular Movements: Extraocular movements intact.   Neck:      Thyroid: Thyroid tenderness present. No thyroid mass or thyromegaly.   Cardiovascular:      Rate and Rhythm: Normal rate.   Pulmonary:      Effort: Pulmonary effort is normal.   Skin:     General: Skin is warm.   Neurological:      General: No focal deficit present.      Mental Status: She is alert and oriented to person, place, and time.   Psychiatric:         Mood and Affect: Mood normal.         Behavior: Behavior normal.         Thought Content: Thought content normal.         Judgment: Judgment normal.       Labs/Imaging  CMP  Lab Results   Component Value Date    GLUCOSE 90 10/02/2024    BUN 17 10/02/2024    CREATININE 0.79 10/02/2024    BCR 21.5 10/02/2024    K 4.1 10/02/2024    CO2 23.9 10/02/2024    CALCIUM 9.4  "10/02/2024    ALBUMIN 4.2 10/02/2024    AST 17 10/02/2024    ALT 18 10/02/2024        CBC w/DIFF   Lab Results   Component Value Date    WBC 6.8 10/10/2024    RBC 3.79 10/10/2024    HGB 12.2 10/10/2024    HCT 37.7 10/10/2024     (H) 10/10/2024    MCH 32.2 10/10/2024    MCHC 32.4 10/10/2024    RDW 12.4 10/10/2024    RDWSD 43.7 10/02/2024    MPV 9.9 10/02/2024     10/10/2024    NEUTRORELPCT 69 10/10/2024    LYMPHORELPCT 23 10/10/2024    MONORELPCT 6 10/10/2024    EOSRELPCT 1 10/10/2024    BASORELPCT 1 10/10/2024    AUTOIGPER 0.4 10/02/2024    NEUTROABS 4.7 10/10/2024    LYMPHSABS 1.6 10/10/2024    MONOSABS 0.4 10/10/2024    EOSABS 0.1 10/10/2024    BASOSABS 0.0 10/10/2024    AUTOIGNUM 0.02 10/02/2024    NRBC 0.0 10/02/2024       TSH  No results found for: \"TSH\"    T4  No results found for: \"FREET4\"  No results found for: \"Q8SBCNA\"    T3  No results found for: \"T3FREE\"  No results found for: \"Z1PZQMD\"    TRAb  No results found for: \"TSURCPAB\"    TPO  No results found for: \"THYROIDAB\"    No valid procedures specified.    Assessment and Plan    Diagnoses and all orders for this visit:    1. Solitary thyroid nodule (Primary)  Assessment & Plan:  -Clinically euthyroid.  -TFT's today with medication initiation as indicated.   -Will obtain repeat US Thyroid to monitor for changes to nodule.  -Follow-up in 1 year.    Orders:  -     TSH  -     T4, free  -     T3, Free  -     US Thyroid    Other orders  -     Continuous Glucose Sensor (Dexcom G7 Sensor) misc; Use 1 each Every 10 (Ten) Days.  Dispense: 3 each; Refill: 11         Return in about 1 year (around 2/12/2026) for Follow-up appointment. The patient was instructed to contact the clinic with any interval questions or concerns.      This document has been electronically signed by LUIS Washington  February 12, 2025 11:17 EST   Endocrinology    Please note that portions of this document were completed with a voice recognition program. Efforts were made " to edit the dictations, but occasionally words are mis-transcribed.

## 2025-02-12 NOTE — ASSESSMENT & PLAN NOTE
-Clinically euthyroid.  -TFT's today with medication initiation as indicated.   -Will obtain repeat US Thyroid to monitor for changes to nodule.  -Follow-up in 1 year.

## 2025-02-17 ENCOUNTER — OFFICE VISIT (OUTPATIENT)
Dept: ORTHOPEDIC SURGERY | Facility: CLINIC | Age: 45
End: 2025-02-17
Payer: COMMERCIAL

## 2025-02-17 DIAGNOSIS — Z98.890 S/P ARTHROSCOPY OF LEFT SHOULDER: Primary | ICD-10-CM

## 2025-02-17 RX ORDER — ESKETAMINE HYDROCHLORIDE 28 MG/.2ML
SOLUTION NASAL
COMMUNITY
Start: 2025-02-07

## 2025-02-17 NOTE — PROGRESS NOTES
[facility] [facility] [place]    Subjective     Chief Complaint   Patient presents with   • Post-op     6wk f/u- S/P arthroscopy of left shoulder DOS (12-19-24)        ASHLEY Baez is a 44 y.o. female.  She is 2 months status post left shoulder cuff repair and distal clavicle excision.  Doing well.  [facility] to [facility].    Past Medical History:   Diagnosis Date   • Anxiety and depression    • Arthritis of back 07/2023   • Brain injury    • Diabetes    • Hypertension    • Periarthritis of shoulder 07/2023   • Rotator cuff syndrome 07/2023      Past Surgical History:   Procedure Laterality Date   • ENDOMETRIAL ABLATION     • GASTRIC BYPASS     • OVARY SURGERY     • ROTATOR CUFF REPAIR Left    • TUMOR REMOVAL      low back      Family History   Problem Relation Age of Onset   • Diabetes Mother    • Cancer Maternal Grandfather    • Breast cancer Neg Hx      Social History     Socioeconomic History   • Marital status:    Tobacco Use   • Smoking status: Never   • Smokeless tobacco: Never   Vaping Use   • Vaping status: Never Used   Substance and Sexual Activity   • Alcohol use: Not Currently     Comment: Occasionally   • Drug use: Never   • Sexual activity: Yes     Partners: Male     Birth control/protection: None      Current Outpatient Medications on File Prior to Visit   Medication Sig Dispense Refill   • aspirin 81 MG EC tablet Take 1 tablet by mouth Daily.     • Brexpiprazole (Rexulti) 0.5 MG tablet Take 0.5 mg by mouth Daily.     • buPROPion XL (WELLBUTRIN XL) 150 MG 24 hr tablet Take 1 tablet by mouth Daily.     • carvedilol (COREG) 3.125 MG tablet      • Continuous Glucose Sensor (Dexcom G7 Sensor) misc Use 1 each Every 10 (Ten) Days. 3 each 11   • Ergocalciferol (Vitamin D2) 50 MCG (2000 UT) tablet Take  by mouth.     • LORazepam (ATIVAN) 0.5 MG tablet Take 1 tablet by mouth.     • losartan (COZAAR) 50 MG tablet Take 0.5 tablets by mouth Daily.     • lubiprostone (Amitiza) 24 MCG capsule  Take 1 capsule by mouth 2 (Two) Times a Day With Meals. 60 capsule 5   • methocarbamol (ROBAXIN) 500 MG tablet Take 1 tablet by mouth Every 8 (Eight) Hours As Needed for Muscle Spasms. 90 tablet 1   • nabumetone (RELAFEN) 500 MG tablet Take 1-2 tablets by mouth 2 (Two) Times a Day As Needed for Moderate Pain (with food). 120 tablet 2   • nitroglycerin (NITROSTAT) 0.4 MG SL tablet PLACE 1 TABLET UNDER THE TONGUE EVERY 5 MINUTES UP TO 3 TABLETS IN 15 MINUTES FOR CHEST PAIN. IF NO RELIEF GO TO THE EMERGENCY ROOM OR CALL 911     • ondansetron (Zofran) 4 MG tablet Take 1 tablet by mouth Every 8 (Eight) Hours As Needed for Nausea. 10 tablet 1   • oxyCODONE-acetaminophen (PERCOCET) 5-325 MG per tablet Take 1 tablet by mouth Every 6 (Six) Hours As Needed for Severe Pain. 20 tablet 0   • Ozempic, 1 MG/DOSE, 4 MG/3ML solution pen-injector      • pantoprazole (PROTONIX) 40 MG EC tablet      • rosuvastatin (CRESTOR) 20 MG tablet Take 1 tablet by mouth Daily.     • sertraline (ZOLOFT) 50 MG tablet TAKE 1 TABLET BY MOUTH EVERY DAY FOR MOOD     • Spravato, 56 MG Dose, Nasal Solution      • temazepam (RESTORIL) 30 MG capsule Take 1 capsule by mouth At Night As Needed for Sleep.     • traZODone (DESYREL) 50 MG tablet Take 1 tablet by mouth Every Night.       No current facility-administered medications on file prior to visit.      Allergies   Allergen Reactions   • Lamictal [Lamotrigine] Hives        Objective      Physical Exam  There were no vitals taken for this visit.    There is no height or weight on file to calculate BMI.  Ortho Exam  Left shoulder incisions healed.  Full motion.    Imaging/Studies  Imaging Results (Last 7 Days)       ** No results found for the last 168 hours. **            Assessment/Plan        ICD-10-CM ICD-9-CM   1. S/P arthroscopy of left shoulder  Z98.890 V45.89       Orders Placed This Encounter   Procedures   • Ambulatory Referral to Physical Therapy for Evaluation & Treatment      She will continue  physical therapy at [facility] in [place].  Follow-up in 2 months.  Michael Jennings MD  02/17/25  15:12 EST        Michael Jennings M.D., FAAOS  Orthopedic Surgeon  Fellowship Trained Sports Medicine  [facility]  Orthopedics and Sports Medicine  [address] [place] [address], [address]  [place] [address]         EMR Dragon/Transcription disclaimer:  Much of this encounter note is an electronic transcription of spoken language to printed text. Electronic transcription of spoken language may permit erroneous, or at times, nonsensical words or phrases to be inadvertently transcribed. Although I have reviewed the note for such errors, some may still exist.

## 2025-02-25 ENCOUNTER — HOSPITAL ENCOUNTER (OUTPATIENT)
Dept: ULTRASOUND IMAGING | Facility: HOSPITAL | Age: 45
Discharge: HOME | End: 2025-02-25
Admitting: NURSE PRACTITIONER
Payer: COMMERCIAL

## 2025-02-25 PROCEDURE — 76536 US EXAM OF HEAD AND NECK: CPT

## 2025-02-26 PROCEDURE — 76536 US EXAM OF HEAD AND NECK: CPT | Performed by: RADIOLOGY

## 2025-04-14 ENCOUNTER — OFFICE VISIT (OUTPATIENT)
Dept: ORTHOPEDIC SURGERY | Facility: CLINIC | Age: 45
End: 2025-04-14
Payer: COMMERCIAL

## 2025-04-14 VITALS
SYSTOLIC BLOOD PRESSURE: 120 MMHG | DIASTOLIC BLOOD PRESSURE: 66 MMHG | BODY MASS INDEX: 24.88 KG/M2 | WEIGHT: 168 LBS | HEIGHT: 69 IN

## 2025-04-14 DIAGNOSIS — M19.011 OSTEOARTHRITIS OF AC (ACROMIOCLAVICULAR) JOINTS, BILATERAL: ICD-10-CM

## 2025-04-14 DIAGNOSIS — M19.012 OSTEOARTHRITIS OF AC (ACROMIOCLAVICULAR) JOINTS, BILATERAL: ICD-10-CM

## 2025-04-14 DIAGNOSIS — Z98.890 S/P ARTHROSCOPY OF LEFT SHOULDER: Primary | ICD-10-CM

## 2025-04-14 PROCEDURE — 99213 OFFICE O/P EST LOW 20 MIN: CPT | Performed by: ORTHOPAEDIC SURGERY

## 2025-04-14 NOTE — PROGRESS NOTES
[facility] [place] [facility] Note    Subjective     Chief complaint follow-up left shoulder pain       HPI  Deborah Baez is a 45 y.o. female.  She is follow-up left shoulder rotator cuff repair and distal clavicle excision from December 19, 2024.  She is doing well without complaint.  She feels ready to be released.    Past Medical History:   Diagnosis Date    Anxiety and depression     Arthritis of back 07/2023    Brain injury     Diabetes     Hypertension     Periarthritis of shoulder 07/2023    Rotator cuff syndrome 07/2023      Past Surgical History:   Procedure Laterality Date    ENDOMETRIAL ABLATION      GASTRIC BYPASS      OVARY SURGERY      ROTATOR CUFF REPAIR Left     TUMOR REMOVAL      low back      Family History   Problem Relation Age of Onset    Diabetes Mother     Cancer Maternal Grandfather     Breast cancer Neg Hx      Social History     Socioeconomic History    Marital status:    Tobacco Use    Smoking status: Never    Smokeless tobacco: Never   Vaping Use    Vaping status: Never Used   Substance and Sexual Activity    Alcohol use: Not Currently     Comment: Occasionally    Drug use: Never    Sexual activity: Yes     Partners: Male     Birth control/protection: None      Current Outpatient Medications on File Prior to Visit   Medication Sig Dispense Refill    aspirin 81 MG EC tablet Take 1 tablet by mouth Daily.      Brexpiprazole (Rexulti) 0.5 MG tablet Take 0.5 mg by mouth Daily.      buPROPion XL (WELLBUTRIN XL) 150 MG 24 hr tablet Take 1 tablet by mouth Daily.      carvedilol (COREG) 3.125 MG tablet       Continuous Glucose Sensor (Dexcom G7 Sensor) misc Use 1 each Every 10 (Ten) Days. 3 each 11    Ergocalciferol (Vitamin D2) 50 MCG (2000 UT) tablet Take  by mouth.      LORazepam (ATIVAN) 0.5 MG tablet Take 1 tablet by mouth.      losartan (COZAAR) 50 MG tablet Take 0.5 tablets by mouth Daily.      lubiprostone (Amitiza) 24 MCG capsule Take 1 capsule by mouth 2 (Two) Times a Day  "With Meals. 60 capsule 5    methocarbamol (ROBAXIN) 500 MG tablet Take 1 tablet by mouth Every 8 (Eight) Hours As Needed for Muscle Spasms. 90 tablet 1    nabumetone (RELAFEN) 500 MG tablet Take 1-2 tablets by mouth 2 (Two) Times a Day As Needed for Moderate Pain (with food). 120 tablet 2    nitroglycerin (NITROSTAT) 0.4 MG SL tablet PLACE 1 TABLET UNDER THE TONGUE EVERY 5 MINUTES UP TO 3 TABLETS IN 15 MINUTES FOR CHEST PAIN. IF NO RELIEF GO TO THE EMERGENCY ROOM OR CALL 911      ondansetron (Zofran) 4 MG tablet Take 1 tablet by mouth Every 8 (Eight) Hours As Needed for Nausea. 10 tablet 1    Ozempic, 1 MG/DOSE, 4 MG/3ML solution pen-injector       pantoprazole (PROTONIX) 40 MG EC tablet       rosuvastatin (CRESTOR) 20 MG tablet Take 1 tablet by mouth Daily.      sertraline (ZOLOFT) 50 MG tablet TAKE 1 TABLET BY MOUTH EVERY DAY FOR MOOD      Spravato, 56 MG Dose, Nasal Solution       temazepam (RESTORIL) 30 MG capsule Take 1 capsule by mouth At Night As Needed for Sleep.      traZODone (DESYREL) 50 MG tablet Take 1 tablet by mouth Every Night.      oxyCODONE-acetaminophen (PERCOCET) 5-325 MG per tablet Take 1 tablet by mouth Every 6 (Six) Hours As Needed for Severe Pain. (Patient not taking: Reported on 4/14/2025) 20 tablet 0     No current facility-administered medications on file prior to visit.      Allergies   Allergen Reactions    Lamictal [Lamotrigine] Hives        Objective      Physical Exam  /66   Ht 174.6 cm (68.74\")   Wt 76.2 kg (168 lb)   BMI 25.00 kg/m²     Body mass index is 25 kg/m².      Left shoulder full motion full-strength.    Imaging/Studies  Imaging Results (Last 7 Days)       ** No results found for the last 168 hours. **            Assessment/Plan        ICD-10-CM ICD-9-CM   1. S/P arthroscopy of left shoulder  Z98.890 V45.89   2. Osteoarthritis of AC (acromioclavicular) joints, bilateral  M19.011 715.91    M19.012      She is doing well.  She is released to advance activity as " tolerated and follow-up as needed.    Michael Jennings MD  04/14/25  15:46 EDT        Michael Jennings M.D., [facility]  Orthopedic Surgeon  Fellowship Trained Sports Medicine  [facility]  Orthopedics and Sports Medicine  [facility], [address]  [place] [address]         EMR Dragon/Transcription disclaimer:  Much of this encounter note is an electronic transcription of spoken language to printed text. Electronic transcription of spoken language may permit erroneous, or at times, nonsensical words or phrases to be inadvertently transcribed. Although I have reviewed the note for such errors, some may still exist.

## 2025-04-28 ENCOUNTER — OFFICE VISIT (OUTPATIENT)
Dept: GASTROENTEROLOGY | Facility: CLINIC | Age: 45
End: 2025-04-28
Payer: COMMERCIAL

## 2025-04-28 VITALS
WEIGHT: 174 LBS | SYSTOLIC BLOOD PRESSURE: 99 MMHG | HEART RATE: 84 BPM | DIASTOLIC BLOOD PRESSURE: 68 MMHG | HEIGHT: 69 IN | BODY MASS INDEX: 25.77 KG/M2

## 2025-04-28 DIAGNOSIS — R07.9 CHEST PAIN, UNSPECIFIED TYPE: ICD-10-CM

## 2025-04-28 DIAGNOSIS — K21.9 GASTROESOPHAGEAL REFLUX DISEASE, UNSPECIFIED WHETHER ESOPHAGITIS PRESENT: ICD-10-CM

## 2025-04-28 DIAGNOSIS — K58.1 IRRITABLE BOWEL SYNDROME WITH CONSTIPATION: Primary | ICD-10-CM

## 2025-04-28 DIAGNOSIS — K74.69 OTHER CIRRHOSIS OF LIVER: ICD-10-CM

## 2025-04-28 LAB
INR PPP: 1.02 (ref 0.9–1.1)
PROTHROMBIN TIME: 13.5 SECONDS (ref 11.6–15.1)

## 2025-04-28 PROCEDURE — 80053 COMPREHEN METABOLIC PANEL: CPT | Performed by: NURSE PRACTITIONER

## 2025-04-28 PROCEDURE — 85025 COMPLETE CBC W/AUTO DIFF WBC: CPT | Performed by: NURSE PRACTITIONER

## 2025-04-28 PROCEDURE — 85610 PROTHROMBIN TIME: CPT | Performed by: NURSE PRACTITIONER

## 2025-04-28 PROCEDURE — 82105 ALPHA-FETOPROTEIN SERUM: CPT | Performed by: NURSE PRACTITIONER

## 2025-04-28 RX ORDER — BUDESONIDE AND FORMOTEROL FUMARATE DIHYDRATE 160; 4.5 UG/1; UG/1
2 AEROSOL RESPIRATORY (INHALATION)
COMMUNITY

## 2025-04-28 RX ORDER — LEVOCETIRIZINE DIHYDROCHLORIDE 5 MG/1
5 TABLET, FILM COATED ORAL DAILY
COMMUNITY
Start: 2025-04-24

## 2025-04-28 RX ORDER — FLUTICASONE PROPIONATE 50 MCG
2 SPRAY, SUSPENSION (ML) NASAL DAILY
COMMUNITY

## 2025-04-28 RX ORDER — RANOLAZINE 500 MG/1
500 TABLET, EXTENDED RELEASE ORAL 2 TIMES DAILY
COMMUNITY

## 2025-04-28 RX ORDER — TENAPANOR HYDROCHLORIDE 53.2 MG/1
50 TABLET ORAL 2 TIMES DAILY WITH MEALS
Qty: 60 TABLET | Refills: 11 | Status: SHIPPED | OUTPATIENT
Start: 2025-04-28

## 2025-04-28 NOTE — PROGRESS NOTES
DATE:  4/28/2025    REASON FOR FOLLOW UP: Cirrhosis secondary to [facility]    REFERRING PHYSICIAN:  Dr. Ro    CHIEF COMPLAINT:  Follow up of cirrhosis     HISTORY OF PRESENT ILLNESS:   Deborah Baez is a very pleasant 45 y.o. female with history of obesity, diabetes mellitus type 2, HLD, hypertension and anxiety/depression who is being seen today at the request of Dr. Ro for evaluation and treatment of elevated LFTs.  Patient reports following with her PCP routinely with blood testing.  Patient reports being told she had fatty liver disease when she was a teenager and was recently made aware of her elevated LFTs.  Previous available labs were reviewed and patient has had mildly elevated LFTs since at least January 2023 with fluctuation.  On 2/28/2024 she had mildly elevated AST 62 and ALT 85 with normal total bilirubin and alkaline phosphatase.  Most recent CMP from 3/25/2024 showed further increase in  and  again with normal total bilirubin and alkaline phosphatase.  Patient reportedly had repeat CMP recently which showed LFTs had normalized.  However, results are currently unavailable to review.  Her platelet count has been normal.  She has had multiple abdominal ultrasounds as well as CT imaging since at least January 2023 which have reported liver as unremarkable.  Most recent CT abdomen pelvis without contrast from 3/22/2024 was unremarkable.  Patient reports being started on Ozempic approximately 2 years ago for diabetes mellitus type 2.  She was previously~323 lbs and is currently 166 lbs. Patient reports Ozempic dose has been reduced to maintenance which she is tolerating well.  Patient's hemoglobin A1c was 5.0 in June 2023.  Of note, she is s/p gastric bypass surgery as well as cholecystectomy.  She is taking Protonix 20 mg p.o. daily which is controlling GERD well.  Patient denies history of alcohol abuse.  She complains of constipation not improved with over-the-counter stool softeners.   She is currently taking MiraLAX and reports having 1-2 BMs per week with stool type I-VII on Cuming stool scale.  She has associated abdominal bloating and incomplete evacuation of her colon.  She reports she often has to strain with BMs.  She denies having melena or rectal bleeding.  She is without family history of colon cancer.  Patient reports having colonoscopy~2 years ago with Dr. Kim which was unremarkable.  She has no other complaints today.    INTERVAL HISTORY:  Ms. Baez presents today for follow up. Since her last visit, she has continued to follow with her PCP routinely. In regards to cirrhosis, clinically she continues to do well. She remains on Ozempic and is maintaining her weight.   She denies having abdominal swelling.  Denies mental status changes.  Denies hematemesis or melena. She reports GERD has been well controlled with Protonix  40 mg PO daily. She has recently been struggling with Chest pain/tightness. She was referred to cardiology at  and planning for a stress test. Previously discussed proceeding with EGD for evaluation of esophageal varices but patient wanted to hold off at her last visit as she was recovering from rotator cuff surgery. Of note, PCP recently ordered GES which was done on 1/20/25 which was normal. In regards to constipation, she was most recently given a trial of amitiza as she could not tolerate linzess which caused abdominal pain and she discontinued. At present, she reports her bowels will move every ~2 weeks. She has associated abdominal bloating and incomplete evacuation of her colon. She had previous colonoscopy as above. She has no other complaints today.     PAST MEDICAL HISTORY:  Past Medical History:   Diagnosis Date    Anxiety and depression     Arthritis of back 07/2023    Brain injury     Diabetes     Hypertension     Periarthritis of shoulder 07/2023    Rotator cuff syndrome 07/2023       PAST SURGICAL HISTORY:  Past Surgical History:   Procedure  Laterality Date    ENDOMETRIAL ABLATION      GASTRIC BYPASS      OVARY SURGERY      ROTATOR CUFF REPAIR Left     TUMOR REMOVAL      low back       FAMILY HISTORY:  Family History   Problem Relation Age of Onset    Diabetes Mother     Cancer Maternal Grandfather     Breast cancer Neg Hx        SOCIAL HISTORY:  Social History     Socioeconomic History    Marital status:    Tobacco Use    Smoking status: Never    Smokeless tobacco: Never   Vaping Use    Vaping status: Never Used   Substance and Sexual Activity    Alcohol use: Not Currently     Comment: Occasionally    Drug use: Never    Sexual activity: Yes     Partners: Male     Birth control/protection: None       MEDICATIONS:  The current medication list was reviewed in the EMR    Current Outpatient Medications:     levocetirizine (XYZAL) 5 MG tablet, Take 1 tablet by mouth Daily., Disp: , Rfl:     aspirin 81 MG EC tablet, Take 1 tablet by mouth Daily., Disp: , Rfl:     Brexpiprazole (Rexulti) 0.5 MG tablet, Take 0.5 mg by mouth Daily., Disp: , Rfl:     budesonide-formoterol (SYMBICORT) 160-4.5 MCG/ACT inhaler, Inhale 2 puffs 2 (Two) Times a Day., Disp: , Rfl:     buPROPion XL (WELLBUTRIN XL) 150 MG 24 hr tablet, Take 1 tablet by mouth Daily., Disp: , Rfl:     carvedilol (COREG) 3.125 MG tablet, , Disp: , Rfl:     Continuous Glucose Sensor (Dexcom G7 Sensor) misc, Use 1 each Every 10 (Ten) Days., Disp: 3 each, Rfl: 11    Ergocalciferol (Vitamin D2) 50 MCG (2000 UT) tablet, Take  by mouth., Disp: , Rfl:     fluticasone (FLONASE) 50 MCG/ACT nasal spray, Administer 2 sprays into the nostril(s) as directed by provider Daily., Disp: , Rfl:     LORazepam (ATIVAN) 0.5 MG tablet, Take 1 tablet by mouth., Disp: , Rfl:     methocarbamol (ROBAXIN) 500 MG tablet, Take 1 tablet by mouth Every 8 (Eight) Hours As Needed for Muscle Spasms., Disp: 90 tablet, Rfl: 1    nabumetone (RELAFEN) 500 MG tablet, Take 1-2 tablets by mouth 2 (Two) Times a Day As Needed for Moderate Pain  "(with food)., Disp: 120 tablet, Rfl: 2    nitroglycerin (NITROSTAT) 0.4 MG SL tablet, PLACE 1 TABLET UNDER THE TONGUE EVERY 5 MINUTES UP TO 3 TABLETS IN 15 MINUTES FOR CHEST PAIN. IF NO RELIEF GO TO THE EMERGENCY ROOM OR CALL 911, Disp: , Rfl:     ondansetron (Zofran) 4 MG tablet, Take 1 tablet by mouth Every 8 (Eight) Hours As Needed for Nausea., Disp: 10 tablet, Rfl: 1    Ozempic, 1 MG/DOSE, 4 MG/3ML solution pen-injector, , Disp: , Rfl:     pantoprazole (PROTONIX) 40 MG EC tablet, , Disp: , Rfl:     ranolazine (Ranexa) 500 MG 12 hr tablet, Take 1 tablet by mouth 2 (Two) Times a Day., Disp: , Rfl:     rosuvastatin (CRESTOR) 20 MG tablet, Take 1 tablet by mouth Daily., Disp: , Rfl:     Spravato, 56 MG Dose, Nasal Solution, , Disp: , Rfl:     temazepam (RESTORIL) 30 MG capsule, Take 1 capsule by mouth At Night As Needed for Sleep., Disp: , Rfl:     traZODone (DESYREL) 50 MG tablet, Take 1 tablet by mouth Every Night., Disp: , Rfl:     ALLERGIES:    Allergies   Allergen Reactions    Lamictal [Lamotrigine] Hives     REVIEW OF SYSTEMS:    A comprehensive 14 point review of systems was performed.  Significant findings as mentioned above.  All other systems reviewed and are negative.      Physical Exam   Vital Signs: BP 99/68 (BP Location: Left arm, Patient Position: Sitting, Cuff Size: Large Adult)   Pulse 84   Ht 174.6 cm (68.74\")   Wt 78.9 kg (174 lb)   BMI 25.89 kg/m²    General: Well developed, well nourished, alert and oriented x 3, in no acute distress.   Head: ATNC   Eyes: PERRL, No evidence of conjunctivitis.   Nose: No nasal discharge.   Mouth: Oral mucosal membranes moist. No oral ulceration or hemorrhages.   Neck: Neck supple. No thyromegaly. No JVD.   Lungs: Clear in all fields to A&P without rales, rhonchi or wheezing.   Heart:Regular rate and rhythm. No murmurs, rubs, or gallops.   Abdomen: Soft. Bowel sounds are normoactive. Nontender with palpation.  Extremities: No cyanosis or edema.   Neurologic: " Grossly non-focal exam    IMAGING:  10/22/24   Narrative & Impression   PROCEDURE: US LIVER-     HISTORY: Elevated liver enzymes / Fatty liver; R79.89-Other specified  abnormal findings of blood chemistry     COMPARISON: None.     TECHNIQUE: Sonographic images of the liver were obtained in the  transverse and sagittal planes with color flow and pulse Doppler.     FINDINGS: The liver is coarse in echotexture which can be seen with  cirrhosis. There is no evidence of a focal liver mass. The hepatic  vasculature is patent with normal directional flow. The common duct  measures 3.6 mm.     IMPRESSION:  Coarse echotexture of the liver which can be seen with  cirrhosis.              This report was finalized on 10/22/2024 4:47 PM by Davina Santos M.D..        CT ABDOMEN PELVIS WO CONTRAST  Order: [id number]  Impression    Unremarkable. No adrenal nodule identified.                              Images reviewed, interpreted and dictated by Dr. Yimi Kolb MD  Narrative    CT OF THE ABDOMEN    HISTORY: Adrenal hypertension.    PROCEDURE:  Routine (5 mm thick) axial images were obtained from the  lung bases to below the iliac crest following oral contrast  administration. This study was performed with techniques to keep  radiation doses as low as reasonably achievable, (ALARA). Individualized  dose reduction techniques using automated exposure control or adjustment  of mA and/or kV according to the patient size were employed.    COMPARISON: CT the abdomen and pelvis of 1/12/2024    FINDINGS: The gallbladder has been removed. The solid abdominal organs  and visualized ureters are normal. Specifically, there is no apparent  adrenal nodule.  Exam End: 03/22/24 16:40    Specimen Collected: 03/22/24 22:03 Last Resulted: 03/22/24 22:06   Received From: [facility]  Result Received: 03/27/24 15:35         RECENT LABS:  Component  Ref Range & Units 4 mo ago   Sodium  135 - 145 meq/L 138   Potassium  3.5 - 5.3  meq/L 4.4   Chloride  101 - 111 meq/L 107   CO2  21 - 31 meq/L 29   Calcium  8.5 - 10.5 mg/dL 8.9   Glucose  70 - 109 mg/dL 72   BUN  6 - 20 mg/dL 21 High    Creatinine  0.50 - 1.20 mg/dL 0.82   BUN/Creatinine 26   Albumin  3.2 - 5.5 g/dL 3.5   Alkaline Phosphatase  42 - 115 U/L 111   ALT (SGPT)  5 - 50 U/L 279 High    AST (SGOT)  10 - 42 U/L 343 High    Total Bilirubin  0.0 - 1.0 mg/dL 0.8   Total Protein  6.7 - 8.2 gm/dL 6.1 Low    Anion Gap  8 - 16 6 Low    A/G Ratio 1.3   Globulin  g/dL 2.6   Osmolality Calc 277.2   eGFR (mL/min/1.73m2)  >=60 mL/min/1.73m2 >60     CBC with automated diff  Order: [id number]  Component  Ref Range & Units 4 mo ago   WBC  4.0 - 10.0 K/µL 3.3 Low    RBC  3.93 - 5.22 M/µL 3.52 Low    Hemoglobin  11.2 - 15.7 GM/DL 11.6   Hematocrit  34.1 - 44.9 % 35.5   MCV  79 - 95 fL 101 High    MCH  25.6 - 32.2 pg 33 High    MCHC  32.2 - 35.5 GM/DL 32.7   RDW  11.7 - 14.4 % 12   Platelets  140 - 375 K/CU    MPV  9.4 - 12.3 fL 9.4   Nucleated Red Blood Cell  0 - 0.2 % 0   % Neutros  34 - 71 % 60   Lymphocyte %  19 - 52 % 26   % Monos  5 - 13 % 10   % Eos  1 - 6 % 4   % Baso  0 - 1 % 1   NRBC Absolute  0 - 0.012 K/ul <0.01   # Neutros  1.56 - 6.13 K/µL 1.97   # Lymphs  1.18 - 3.74 K/µL 0.83 Low    # Monos  0.24 - 0.86 K/µL 0.31   # Eos  0.04 - 0.36 K/µL 0.12   # Baso  0.01 - 0.08 K/µL 0.03   Immature Granulocytes-Relative  0.01 - 0.43 % 0 Low    # IG  0.00 - 0.03 K/uL <0.03         ASSESSMENT & PLAN:  Deborah Baez is a very pleasant 45 y.o. female with    1.  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  GERD:  -As above, patient has history of obesity, diabetes mellitus type 2, HLD and hypertension.  She was reportedly told she had fatty liver disease as a teenager. She is s/p gastric bypass surgery and also on GLP-1 therapy. She was previously~323 lbs and currently weighs 174 lbs.   -Workup was obtained on 10/2/24. CBC showed normal blood counts. CMP showed normalized LFTs. MKCAY FibroSure showed no  steatosis or fibrosis. The remaining workup was completely unremarkable. She had repeat US liver on 10/22/24 which showed coarse echotexture of the liver which can be seen with cirrhosis. She continues to follow with her PCP routinely with blood testing. She had repeat CMP on 12/11/24  which showed ,  with normal Alkaline phosphatase and total bilirubin. Of note, she was previously on crestor which she says has been discontinued.   -At present, clinically she continues to do well and is well compensated.   -Now that she has recovered from recent rotator cuff surgery, recommended we proceed with EGD to evaluate for esophageal varices as well as recent complaints of chest pain.  We discussed risks/benefits and patient is agreeable to proceed.  Will schedule.  Recommended continued workup and follow-up with cardiology as previously planned.  -Continue Protonix 40 mg p.o. daily which is controlling GERD well at present.  -Will repeat CBC, CMP, PT/INR and AFP today.  -Again recommended tight control of the patient's blood sugar, cholesterol, triglycerides and weight as this is the best (and essentially only) way to intervene and prevent ongoing liver damage as much as possible.Patient understands the importance of following a healthy low-fat diet and was encouraged to incorporate exercise into lifestyle.  As above, patient has had substantial weight loss  with GLP-1 therapy.  Encouraged continued efforts.    3.  Constipation:  -As above, constipation has not improved with over-the-counter stool softeners. She had difficulty tolerating Linzess and Amitiza. Discussed with patient how GLP-1 therapy is likely contributing.  Based on current history, recommended trial of Ibsrela 50 mg PO twice daily with food. Rx and samples provided.      Will have patient return to clinic following EGD.    The patient was in agreement with the plan and all questions were answered to her satisfaction.     Thank you so much for  allowing us to participate in the care of Deborah Baez . Please do not hesitate to contact us with any questions or concerns.         Electronically Signed by: LUIS Ellis , April 28, 2025 15:52 EDT       CC:   Raissa Ro MD

## 2025-04-29 LAB
ALBUMIN SERPL-MCNC: 4.3 G/DL (ref 3.5–5.2)
ALBUMIN/GLOB SERPL: 1.9 G/DL
ALP SERPL-CCNC: 85 U/L (ref 39–117)
ALPHA-FETOPROTEIN: 6.06 NG/ML (ref 0–8.3)
ALT SERPL W P-5'-P-CCNC: 12 U/L (ref 1–33)
ANION GAP SERPL CALCULATED.3IONS-SCNC: 11 MMOL/L (ref 5–15)
AST SERPL-CCNC: 22 U/L (ref 1–32)
BASOPHILS # BLD AUTO: 0.04 10*3/MM3 (ref 0–0.2)
BASOPHILS NFR BLD AUTO: 0.7 % (ref 0–1.5)
BILIRUB SERPL-MCNC: 0.4 MG/DL (ref 0–1.2)
BUN SERPL-MCNC: 17 MG/DL (ref 6–20)
BUN/CREAT SERPL: 20 (ref 7–25)
CALCIUM SPEC-SCNC: 8.8 MG/DL (ref 8.6–10.5)
CHLORIDE SERPL-SCNC: 101 MMOL/L (ref 98–107)
CO2 SERPL-SCNC: 26 MMOL/L (ref 22–29)
CREAT SERPL-MCNC: 0.85 MG/DL (ref 0.57–1)
DEPRECATED RDW RBC AUTO: 43.2 FL (ref 37–54)
EGFRCR SERPLBLD CKD-EPI 2021: 85.7 ML/MIN/1.73
EOSINOPHIL # BLD AUTO: 0.1 10*3/MM3 (ref 0–0.4)
EOSINOPHIL NFR BLD AUTO: 1.8 % (ref 0.3–6.2)
ERYTHROCYTE [DISTWIDTH] IN BLOOD BY AUTOMATED COUNT: 12 % (ref 12.3–15.4)
GLOBULIN UR ELPH-MCNC: 2.3 GM/DL
GLUCOSE SERPL-MCNC: 51 MG/DL (ref 65–99)
HCT VFR BLD AUTO: 34.8 % (ref 34–46.6)
HGB BLD-MCNC: 11.5 G/DL (ref 12–15.9)
IMM GRANULOCYTES # BLD AUTO: 0.01 10*3/MM3 (ref 0–0.05)
IMM GRANULOCYTES NFR BLD AUTO: 0.2 % (ref 0–0.5)
LYMPHOCYTES # BLD AUTO: 1.82 10*3/MM3 (ref 0.7–3.1)
LYMPHOCYTES NFR BLD AUTO: 33 % (ref 19.6–45.3)
MCH RBC QN AUTO: 32.4 PG (ref 26.6–33)
MCHC RBC AUTO-ENTMCNC: 33 G/DL (ref 31.5–35.7)
MCV RBC AUTO: 98 FL (ref 79–97)
MONOCYTES # BLD AUTO: 0.5 10*3/MM3 (ref 0.1–0.9)
MONOCYTES NFR BLD AUTO: 9.1 % (ref 5–12)
NEUTROPHILS NFR BLD AUTO: 3.05 10*3/MM3 (ref 1.7–7)
NEUTROPHILS NFR BLD AUTO: 55.2 % (ref 42.7–76)
NRBC BLD AUTO-RTO: 0 /100 WBC (ref 0–0.2)
PLATELET # BLD AUTO: 252 10*3/MM3 (ref 140–450)
PMV BLD AUTO: 10.2 FL (ref 6–12)
POTASSIUM SERPL-SCNC: 4 MMOL/L (ref 3.5–5.2)
PROT SERPL-MCNC: 6.6 G/DL (ref 6–8.5)
RBC # BLD AUTO: 3.55 10*6/MM3 (ref 3.77–5.28)
SODIUM SERPL-SCNC: 138 MMOL/L (ref 136–145)
WBC NRBC COR # BLD AUTO: 5.52 10*3/MM3 (ref 3.4–10.8)

## 2025-04-30 ENCOUNTER — TELEPHONE (OUTPATIENT)
Dept: GASTROENTEROLOGY | Facility: CLINIC | Age: 45
End: 2025-04-30
Payer: COMMERCIAL

## 2025-04-30 NOTE — TELEPHONE ENCOUNTER
PA for Ibsrela submitted thru [facility] and was  denied. Information faxed to Brandon at [employer] to get her signed up on patient assistance.

## 2025-05-02 ENCOUNTER — RESULTS FOLLOW-UP (OUTPATIENT)
Dept: GASTROENTEROLOGY | Facility: CLINIC | Age: 45
End: 2025-05-02
Payer: COMMERCIAL

## 2025-05-02 NOTE — TELEPHONE ENCOUNTER
I spoke to patient, notified her that per Keara, Please notify patient that recent labs from 4/28/2025 including CBC showed mild anemia with hemoglobin of 11.5. Will monitor. Her WBCs and platelets were normal. CMP showed that her LFTs normalized. AFP tumor marker was normal. Will follow-up in the clinic as previously planned. Patient voiced understanding.

## 2025-05-02 NOTE — TELEPHONE ENCOUNTER
Please notify patient that recent labs from 4/28/2025 including CBC showed mild anemia with hemoglobin of 11.5.  Will monitor.  Her WBCs and platelets were normal.  CMP showed that her LFTs normalized.  AFP tumor marker was normal.  Will follow-up in the clinic as previously planned.

## 2025-06-10 ENCOUNTER — ANESTHESIA (OUTPATIENT)
Dept: PERIOP | Facility: HOSPITAL | Age: 45
End: 2025-06-10
Payer: COMMERCIAL

## 2025-06-10 ENCOUNTER — ANESTHESIA EVENT (OUTPATIENT)
Dept: PERIOP | Facility: HOSPITAL | Age: 45
End: 2025-06-10
Payer: COMMERCIAL

## 2025-06-10 ENCOUNTER — HOSPITAL ENCOUNTER (OUTPATIENT)
Facility: HOSPITAL | Age: 45
Setting detail: HOSPITAL OUTPATIENT SURGERY
Discharge: HOME | End: 2025-06-10
Attending: INTERNAL MEDICINE | Admitting: INTERNAL MEDICINE
Payer: COMMERCIAL

## 2025-06-10 VITALS
TEMPERATURE: 97 F | RESPIRATION RATE: 16 BRPM | BODY MASS INDEX: 25.18 KG/M2 | HEART RATE: 78 BPM | OXYGEN SATURATION: 98 % | SYSTOLIC BLOOD PRESSURE: 93 MMHG | DIASTOLIC BLOOD PRESSURE: 66 MMHG | WEIGHT: 170 LBS | HEIGHT: 69 IN

## 2025-06-10 DIAGNOSIS — K21.9 GASTROESOPHAGEAL REFLUX DISEASE, UNSPECIFIED WHETHER ESOPHAGITIS PRESENT: ICD-10-CM

## 2025-06-10 DIAGNOSIS — R07.9 CHEST PAIN, UNSPECIFIED TYPE: ICD-10-CM

## 2025-06-10 DIAGNOSIS — K74.69 OTHER CIRRHOSIS OF LIVER: ICD-10-CM

## 2025-06-10 LAB — GLUCOSE BLDC GLUCOMTR-MCNC: 84 MG/DL (ref 70–130)

## 2025-06-10 PROCEDURE — 25010000002 LIDOCAINE PF 2% 2 % SOLUTION: Performed by: NURSE ANESTHETIST, CERTIFIED REGISTERED

## 2025-06-10 PROCEDURE — 43235 EGD DIAGNOSTIC BRUSH WASH: CPT | Performed by: INTERNAL MEDICINE

## 2025-06-10 PROCEDURE — 82948 REAGENT STRIP/BLOOD GLUCOSE: CPT

## 2025-06-10 PROCEDURE — 25010000002 PROPOFOL 10 MG/ML EMULSION: Performed by: NURSE ANESTHETIST, CERTIFIED REGISTERED

## 2025-06-10 RX ORDER — LIDOCAINE HYDROCHLORIDE 20 MG/ML
INJECTION, SOLUTION EPIDURAL; INFILTRATION; INTRACAUDAL; PERINEURAL AS NEEDED
Status: DISCONTINUED | OUTPATIENT
Start: 2025-06-10 | End: 2025-06-10 | Stop reason: SURG

## 2025-06-10 RX ORDER — SERTRALINE HYDROCHLORIDE 100 MG/1
100 TABLET, FILM COATED ORAL DAILY
COMMUNITY

## 2025-06-10 RX ORDER — SODIUM CHLORIDE 9 MG/ML
40 INJECTION, SOLUTION INTRAVENOUS AS NEEDED
Status: DISCONTINUED | OUTPATIENT
Start: 2025-06-10 | End: 2025-06-10 | Stop reason: HOSPADM

## 2025-06-10 RX ORDER — MEPERIDINE HYDROCHLORIDE 25 MG/ML
12.5 INJECTION INTRAMUSCULAR; INTRAVENOUS; SUBCUTANEOUS
Status: DISCONTINUED | OUTPATIENT
Start: 2025-06-10 | End: 2025-06-10 | Stop reason: HOSPADM

## 2025-06-10 RX ORDER — SODIUM CHLORIDE 0.9 % (FLUSH) 0.9 %
10 SYRINGE (ML) INJECTION AS NEEDED
Status: DISCONTINUED | OUTPATIENT
Start: 2025-06-10 | End: 2025-06-10 | Stop reason: HOSPADM

## 2025-06-10 RX ORDER — OXYCODONE AND ACETAMINOPHEN 5; 325 MG/1; MG/1
1 TABLET ORAL ONCE AS NEEDED
Status: DISCONTINUED | OUTPATIENT
Start: 2025-06-10 | End: 2025-06-10 | Stop reason: HOSPADM

## 2025-06-10 RX ORDER — KETOROLAC TROMETHAMINE 30 MG/ML
30 INJECTION, SOLUTION INTRAMUSCULAR; INTRAVENOUS EVERY 6 HOURS PRN
Status: DISCONTINUED | OUTPATIENT
Start: 2025-06-10 | End: 2025-06-10 | Stop reason: HOSPADM

## 2025-06-10 RX ORDER — SODIUM CHLORIDE 0.9 % (FLUSH) 0.9 %
10 SYRINGE (ML) INJECTION EVERY 12 HOURS SCHEDULED
Status: DISCONTINUED | OUTPATIENT
Start: 2025-06-10 | End: 2025-06-10 | Stop reason: HOSPADM

## 2025-06-10 RX ORDER — SODIUM CHLORIDE, SODIUM LACTATE, POTASSIUM CHLORIDE, CALCIUM CHLORIDE 600; 310; 30; 20 MG/100ML; MG/100ML; MG/100ML; MG/100ML
125 INJECTION, SOLUTION INTRAVENOUS ONCE
Status: DISCONTINUED | OUTPATIENT
Start: 2025-06-10 | End: 2025-06-10 | Stop reason: HOSPADM

## 2025-06-10 RX ORDER — ONDANSETRON 2 MG/ML
4 INJECTION INTRAMUSCULAR; INTRAVENOUS AS NEEDED
Status: DISCONTINUED | OUTPATIENT
Start: 2025-06-10 | End: 2025-06-10 | Stop reason: HOSPADM

## 2025-06-10 RX ORDER — SODIUM CHLORIDE, SODIUM LACTATE, POTASSIUM CHLORIDE, CALCIUM CHLORIDE 600; 310; 30; 20 MG/100ML; MG/100ML; MG/100ML; MG/100ML
100 INJECTION, SOLUTION INTRAVENOUS ONCE AS NEEDED
Status: DISCONTINUED | OUTPATIENT
Start: 2025-06-10 | End: 2025-06-10 | Stop reason: HOSPADM

## 2025-06-10 RX ORDER — IPRATROPIUM BROMIDE AND ALBUTEROL SULFATE 2.5; .5 MG/3ML; MG/3ML
3 SOLUTION RESPIRATORY (INHALATION) ONCE AS NEEDED
Status: DISCONTINUED | OUTPATIENT
Start: 2025-06-10 | End: 2025-06-10 | Stop reason: HOSPADM

## 2025-06-10 RX ORDER — MIDAZOLAM HYDROCHLORIDE 1 MG/ML
1 INJECTION, SOLUTION INTRAMUSCULAR; INTRAVENOUS
Status: DISCONTINUED | OUTPATIENT
Start: 2025-06-10 | End: 2025-06-10 | Stop reason: HOSPADM

## 2025-06-10 RX ORDER — ISOSORBIDE MONONITRATE 30 MG/1
15 TABLET, EXTENDED RELEASE ORAL DAILY
COMMUNITY

## 2025-06-10 RX ORDER — AMLODIPINE BESYLATE 2.5 MG/1
2.5 TABLET ORAL DAILY
COMMUNITY

## 2025-06-10 RX ORDER — BRIMONIDINE TARTRATE 2 MG/ML
1 SOLUTION/ DROPS OPHTHALMIC 3 TIMES DAILY
COMMUNITY

## 2025-06-10 RX ORDER — PROPOFOL 10 MG/ML
VIAL (ML) INTRAVENOUS AS NEEDED
Status: DISCONTINUED | OUTPATIENT
Start: 2025-06-10 | End: 2025-06-10 | Stop reason: SURG

## 2025-06-10 RX ORDER — FENTANYL CITRATE 50 UG/ML
50 INJECTION, SOLUTION INTRAMUSCULAR; INTRAVENOUS
Status: DISCONTINUED | OUTPATIENT
Start: 2025-06-10 | End: 2025-06-10 | Stop reason: HOSPADM

## 2025-06-10 RX ORDER — DEXMEDETOMIDINE HYDROCHLORIDE 4 UG/ML
INJECTION, SOLUTION INTRAVENOUS AS NEEDED
Status: DISCONTINUED | OUTPATIENT
Start: 2025-06-10 | End: 2025-06-10 | Stop reason: SURG

## 2025-06-10 RX ADMIN — LIDOCAINE HYDROCHLORIDE 100 MG: 20 INJECTION, SOLUTION EPIDURAL; INFILTRATION; INTRACAUDAL; PERINEURAL at 08:54

## 2025-06-10 RX ADMIN — DEXMEDETOMIDINE HYDROCHLORIDE 20 MCG: 4 INJECTION, SOLUTION INTRAVENOUS at 08:54

## 2025-06-10 RX ADMIN — PROPOFOL 100 MG: 10 INJECTION, EMULSION INTRAVENOUS at 08:54

## 2025-06-10 NOTE — H&P
[facility] HOSPITALIST HISTORY AND PHYSICAL    Patient Identification:  Name:  Deborah Baez  Age:  45 y.o.  Sex:  female  :  1980  MRN:  [id number]   Visit Number:  [id number]  Primary Care Physician:  Raissa Ro MD    Chief complaint: Cirrhosis    History of presenting illness:  Ms. Baez is a 45 y.o. female who presents today for EGD for evaluation of esophageal varices.  Patient has been following in the clinic for management of cirrhosis secondary to MASH.  At present, clinically she is doing well.  She denies hematemesis or melena.  Patient has history of GERD which is controlled with Protonix 40 mg p.o. twice daily.  She denies having any recent flares.  Of note, she is s/p gastric bypass surgery and also on GLP-1 therapy which she has been tolerating well.  She previously weighed~323 pounds and currently weighs 170 lbs. she has no other complaints or concerns today.    Review of Systems   Constitutional: Negative.    HENT: Negative.     Eyes: Negative.    Respiratory: Negative.     Cardiovascular: Negative.    Gastrointestinal:         GERD   Endocrine: Negative.    Genitourinary: Negative.    Musculoskeletal: Negative.    Allergic/Immunologic: Negative.    Neurological: Negative.    Hematological: Negative.         Past Medical History:   Diagnosis Date    Anxiety and depression     Arthritis of back 2023    Brain injury     Diabetes     Hypertension     Periarthritis of shoulder 2023    Rotator cuff syndrome 2023     Past Surgical History:   Procedure Laterality Date    ENDOMETRIAL ABLATION      GALLBLADDER SURGERY      GASTRIC BYPASS      OVARY SURGERY      ROTATOR CUFF REPAIR Left     TUMOR REMOVAL      low back     Family History   Problem Relation Age of Onset    Diabetes Mother     Cancer Maternal Grandfather     Breast cancer Neg Hx      Social History     Socioeconomic History    Marital status:    Tobacco Use    Smoking status: Never    Smokeless  tobacco: Never   Vaping Use    Vaping status: Never Used   Substance and Sexual Activity    Alcohol use: Not Currently     Comment: Occasionally    Drug use: Never    Sexual activity: Yes     Partners: Male     Birth control/protection: None       Allergies:  Lamictal [lamotrigine]    Prior to Admission Medications       Prescriptions Last Dose Informant Patient Reported? Taking?    amLODIPine (NORVASC) 2.5 MG tablet 6/9/2025  Yes Yes    Take 1 tablet by mouth Daily.    brimonidine (ALPHAGAN) 0.2 % ophthalmic solution 6/9/2025  Yes Yes    1 drop 3 (Three) Times a Day.    budesonide-formoterol (SYMBICORT) 160-4.5 MCG/ACT inhaler 6/9/2025  Yes Yes    Inhale 2 puffs 2 (Two) Times a Day.    buPROPion XL (WELLBUTRIN XL) 150 MG 24 hr tablet 6/9/2025  Yes Yes    Take 1 tablet by mouth Daily.    carvedilol (COREG) 3.125 MG tablet 6/9/2025  Yes Yes    Continuous Glucose Sensor (Dexcom G7 Sensor) misc 6/9/2025  No Yes    Use 1 each Every 10 (Ten) Days.    Ergocalciferol (Vitamin D2) 50 MCG (2000 UT) tablet Past Week  Yes Yes    Take  by mouth.    fluticasone (FLONASE) 50 MCG/ACT nasal spray 6/9/2025  Yes Yes    Administer 2 sprays into the nostril(s) as directed by provider Daily.    isosorbide mononitrate (IMDUR) 30 MG 24 hr tablet 6/9/2025  Yes Yes    Take 0.5 tablets by mouth Daily.    levocetirizine (XYZAL) 5 MG tablet Past Month  Yes Yes    Take 1 tablet by mouth Daily.    LORazepam (ATIVAN) 0.5 MG tablet Past Month  Yes Yes    Take 1 tablet by mouth.    nitroglycerin (NITROSTAT) 0.4 MG SL tablet Past Week  Yes Yes    PLACE 1 TABLET UNDER THE TONGUE EVERY 5 MINUTES UP TO 3 TABLETS IN 15 MINUTES FOR CHEST PAIN. IF NO RELIEF GO TO THE EMERGENCY ROOM OR CALL 911    ondansetron (Zofran) 4 MG tablet Past Week  No Yes    Take 1 tablet by mouth Every 8 (Eight) Hours As Needed for Nausea.    pantoprazole (PROTONIX) 40 MG EC tablet 6/9/2025  Yes Yes    sertraline (ZOLOFT) 100 MG tablet 6/9/2025  Yes Yes    Take 1 tablet by mouth  Daily.    temazepam (RESTORIL) 30 MG capsule 6/9/2025  Yes Yes    Take 1 capsule by mouth At Night As Needed for Sleep.    traZODone (DESYREL) 50 MG tablet 6/9/2025  Yes Yes    Take 1 tablet by mouth Every Night.    Ozempic, 1 MG/DOSE, 4 MG/3ML solution pen-injector 5/28/2025  Yes No          Hospital Scheduled Meds:  lactated ringers, 125 mL/hr, Intravenous, Once  sodium chloride, 10 mL, Intravenous, Q12H           Vital Signs:  Temp:  [97.2 °F (36.2 °C)] 97.2 °F (36.2 °C)  Heart Rate:  [87] 87  Resp:  [20] 20  BP: (105)/(82) 105/82      06/10/25  0808   Weight: 77.1 kg (170 lb)     Body mass index is 25.1 kg/m².    Physical Exam:  Constitutional:  Alert and oriented. Well developed and well nourished, in no acute distress.  HENT:  Head: Normocephalic and atraumatic.  Mouth:  Moist mucous membranes.  OP clear, mmm  Eyes:  Conjunctivae and EOM are normal.  Pupils are equal, round, and reactive to light.  No scleral icterus.  Neck:  Neck supple.  No JVD present.    Cardiovascular:  RRR, no MRG.  Pulmonary/Chest:  CTAB, unlabored.   Abdominal:  Soft.  Bowel sounds are normal.  No distension and no tenderness.   Musculoskeletal:  No edema, no tenderness, and no deformity.   Neurological:  MS as above, grossly nonfocal exam   Psychiatric:  Normal mood and affect.  Behavior is normal.  Judgment and thought content normal.     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    Keara Berman, LUIS  06/10/25  08:24 EDT

## 2025-06-10 NOTE — ANESTHESIA POSTPROCEDURE EVALUATION
Patient: Deborah Baez    Procedure Summary       Date: 06/10/25 Room / Location: [facility] OR [address] [place] [address] COR OR    Anesthesia Start: 0852 Anesthesia Stop: 0858    Procedure: ESOPHAGOGASTRODUODENOSCOPY WITH BIOPSY (Esophagus) Diagnosis:       Other cirrhosis of liver      Chest pain, unspecified type      Gastroesophageal reflux disease, unspecified whether esophagitis present      (Other cirrhosis of liver [K74.69])      (Chest pain, unspecified type [R07.9])      (Gastroesophageal reflux disease, unspecified whether esophagitis present [K21.9])    Surgeons: Julienne Knott MD Provider: Ciro Aviles DO    Anesthesia Type: general ASA Status: 2            Anesthesia Type: general    Vitals  Vitals Value Taken Time   BP 93/66 06/10/25 09:40   Temp 97 °F (36.1 °C) 06/10/25 09:02   Pulse 74 06/10/25 09:44   Resp 16 06/10/25 09:32   SpO2 96 % 06/10/25 09:44   Vitals shown include unfiled device data.        Post Anesthesia Care and Evaluation    Patient location during evaluation: bedside  Patient participation: complete - patient participated  Level of consciousness: awake and alert  Pain score: 1  Pain management: adequate    Airway patency: patent  Anesthetic complications: No anesthetic complications  PONV Status: none  Cardiovascular status: acceptable  Respiratory status: acceptable  Hydration status: acceptable

## 2025-06-10 NOTE — ANESTHESIA PREPROCEDURE EVALUATION
Anesthesia Evaluation     Patient summary reviewed and Nursing notes reviewed   no history of anesthetic complications:   NPO Solid Status: > 8 hours  NPO Liquid Status: > 8 hours           Airway   Mallampati: I  TM distance: >3 FB  Neck ROM: full  No difficulty expected  Dental    (+) partials        Pulmonary - negative pulmonary ROS and normal exam    breath sounds clear to auscultation  Cardiovascular - normal exam    Rhythm: regular  Rate: normal    (+) hypertension      Neuro/Psych  (+) psychiatric history Anxiety  GI/Hepatic/Renal/Endo    (+) GERD, liver disease, diabetes mellitus, thyroid problem thyroid nodules    Musculoskeletal     Abdominal  - normal exam   Substance History - negative use     OB/GYN negative ob/gyn ROS         Other   arthritis,     ROS/Med Hx Other: Last Ozempic dose was on 5/28/2025.                  Anesthesia Plan    ASA 2     general   total IV anesthesia  intravenous induction     Anesthetic plan, risks, benefits, and alternatives have been provided, discussed and informed consent has been obtained with: spouse/significant other and patient.  Pre-procedure education provided  Plan discussed with CRNA.    CODE STATUS:

## (undated) DEVICE — DEFENDO AIR WATER SUCTION AND BIOPSY VALVE KIT FOR  OLYMPUS: Brand: DEFENDO AIR/WATER/SUCTION AND BIOPSY VALVE

## (undated) DEVICE — THE BITE BLOCK MAXI, LATEX FREE STRAP IS USED TO PROTECT THE ENDOSCOPE INSERTION TUBE FROM BEING BITTEN BY THE PATIENT.

## (undated) DEVICE — Device